# Patient Record
Sex: MALE | Race: AMERICAN INDIAN OR ALASKA NATIVE
[De-identification: names, ages, dates, MRNs, and addresses within clinical notes are randomized per-mention and may not be internally consistent; named-entity substitution may affect disease eponyms.]

---

## 2019-02-17 ENCOUNTER — HOSPITAL ENCOUNTER (INPATIENT)
Dept: HOSPITAL 14 - H.ER | Age: 49
LOS: 9 days | Discharge: HOME | DRG: 321 | End: 2019-02-26
Attending: INTERNAL MEDICINE | Admitting: INTERNAL MEDICINE
Payer: MEDICAID

## 2019-02-17 DIAGNOSIS — R00.1: ICD-10-CM

## 2019-02-17 DIAGNOSIS — M51.26: ICD-10-CM

## 2019-02-17 DIAGNOSIS — G95.89: ICD-10-CM

## 2019-02-17 DIAGNOSIS — M48.061: ICD-10-CM

## 2019-02-17 DIAGNOSIS — J02.9: ICD-10-CM

## 2019-02-17 DIAGNOSIS — N39.41: ICD-10-CM

## 2019-02-17 DIAGNOSIS — D72.829: ICD-10-CM
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
## 2019-02-17 DIAGNOSIS — T38.0X5A: ICD-10-CM

## 2019-02-17 DIAGNOSIS — M21.371: ICD-10-CM

## 2019-02-17 DIAGNOSIS — M48.02: ICD-10-CM

## 2019-02-17 DIAGNOSIS — F17.210: ICD-10-CM

## 2019-02-17 DIAGNOSIS — R53.1: ICD-10-CM

## 2019-02-17 DIAGNOSIS — F12.90: ICD-10-CM

## 2019-02-17 DIAGNOSIS — M51.37: ICD-10-CM

## 2019-02-17 DIAGNOSIS — R25.8: ICD-10-CM

## 2019-02-17 DIAGNOSIS — M50.021: Primary | ICD-10-CM

## 2019-02-17 DIAGNOSIS — K59.00: ICD-10-CM

## 2019-02-17 LAB
ALBUMIN SERPL-MCNC: 4.5 G/DL (ref 3.5–5)
ALBUMIN/GLOB SERPL: 1.3 {RATIO} (ref 1–2.1)
ALT SERPL-CCNC: 34 U/L (ref 21–72)
AST SERPL-CCNC: 22 U/L (ref 17–59)
BASOPHILS # BLD AUTO: 0.1 K/UL (ref 0–0.2)
BASOPHILS NFR BLD: 0.9 % (ref 0–2)
BILIRUB UR-MCNC: NEGATIVE MG/DL
BUN SERPL-MCNC: 19 MG/DL (ref 9–20)
CALCIUM SERPL-MCNC: 9.8 MG/DL (ref 8.4–10.2)
COLOR UR: YELLOW
EOSINOPHIL # BLD AUTO: 0.1 K/UL (ref 0–0.7)
EOSINOPHIL NFR BLD: 2.1 % (ref 0–4)
ERYTHROCYTE [DISTWIDTH] IN BLOOD BY AUTOMATED COUNT: 13.6 % (ref 11.5–14.5)
ERYTHROCYTE [SEDIMENTATION RATE] IN BLOOD: 11 MM/HR (ref 0–15)
GFR NON-AFRICAN AMERICAN: > 60
GLUCOSE UR STRIP-MCNC: (no result) MG/DL
HGB BLD-MCNC: 14.9 G/DL (ref 12–18)
LEUKOCYTE ESTERASE UR-ACNC: (no result) LEU/UL
LYMPHOCYTES # BLD AUTO: 2.2 K/UL (ref 1–4.3)
LYMPHOCYTES NFR BLD AUTO: 31.8 % (ref 20–40)
MCH RBC QN AUTO: 29.6 PG (ref 27–31)
MCHC RBC AUTO-ENTMCNC: 33.6 G/DL (ref 33–37)
MCV RBC AUTO: 88.2 FL (ref 80–94)
MONOCYTES # BLD: 0.7 K/UL (ref 0–0.8)
MONOCYTES NFR BLD: 9.7 % (ref 0–10)
NEUTROPHILS # BLD: 3.8 K/UL (ref 1.8–7)
NEUTROPHILS NFR BLD AUTO: 55.5 % (ref 50–75)
NRBC BLD AUTO-RTO: 0.2 % (ref 0–0)
PH UR STRIP: 6 [PH] (ref 5–8)
PLATELET # BLD: 246 K/UL (ref 130–400)
PMV BLD AUTO: 8.4 FL (ref 7.2–11.7)
PROT UR STRIP-MCNC: NEGATIVE MG/DL
RBC # BLD AUTO: 5.02 MIL/UL (ref 4.4–5.9)
RBC # UR STRIP: (no result) /UL
SP GR UR STRIP: 1.02 (ref 1–1.03)
SQUAMOUS EPITHIAL: 1 /HPF (ref 0–5)
URINE CLARITY: CLEAR
UROBILINOGEN UR-MCNC: (no result) MG/DL (ref 0.2–1)
WBC # BLD AUTO: 6.9 K/UL (ref 4.8–10.8)

## 2019-02-17 NOTE — ED PDOC
HPI: Back


Time Seen by Provider: 02/17/19 18:10


Chief Complaint (Nursing): Back Pain


Chief Complaint (Provider): Back Pain


History Per: Patient


History/Exam Limitations: no limitations


Onset/Duration Of Symptoms: Days (x3 months)


Current Symptoms Are (Timing): Still Present


Quality Of Discomfort: "Pain"


Additional Complaint(s): 


48 year old male with no PMHx presents to the ED with low back pain. Patient 

states that back in November he injured his lower back moving furniture. At the 

time he was seen in [facility] where he had a CT done and was informed he had

a bulging disc in lower back. He was discharged from the ED with a prescription 

for Naproxen and steroids, with no relief in symptoms. Patient did not follow up

with doctor. This past week, his leg weakness became worse and he also has been 

having urinary incontinence intermittently for the past week and a half. Also 

reports having numbness to the saddle area. Since Christmas, he has had 

difficulty having erections. He denies any stool incontinence, abdominal pain, 

hematuria, new trauma, IV drug use, fever or chills.





PMD: none provided





Past Medical History


Reviewed: Historical Data, Nursing Documentation, Vital Signs


Vital Signs: 


                                Last Vital Signs











Temp  98.6 F   02/17/19 18:06


 


Pulse  64   02/17/19 18:06


 


Resp  15   02/17/19 18:06


 


BP  148/86   02/17/19 18:06


 


Pulse Ox  100   02/17/19 18:06














- Medical History


PMH: Back Problems





- Surgical History


Surgical History: No Surg Hx





- Family History


Family History: States: Unknown Family Hx





- Social History


Drugs: Cannabis





- Immunization History


Hx Tetanus Toxoid Vaccination: No


Hx Influenza Vaccination: No


Hx Pneumococcal Vaccination: No





- Home Medications


Home Medications: 


                                Ambulatory Orders











 Medication  Instructions  Recorded


 


No Known Home Med  02/17/19














- Allergies


Allergies/Adverse Reactions: 


                                    Allergies











Allergy/AdvReac Type Severity Reaction Status Date / Time


 


No Known Allergies Allergy   Unverified 02/17/19 18:06














Review of Systems


ROS Statement: Except As Marked, All Systems Reviewed And Found Negative


Constitutional: Negative for: Fever, Chills


Gastrointestinal: Negative for: Abdominal Pain


Genitourinary Male: Positive for: Incontinence (urinary but no stool).  Negative

for: Hematuria


Musculoskeletal: Positive for: Back Pain, Other (leg weakness)





Physical Exam





- Reviewed


Nursing Documentation Reviewed: Yes


Vital Signs Reviewed: Yes





- Physical Exam


Appears: Positive for: In Acute Distress (mild painful)


Skin: Positive for: Normal Color, Warm, Dry


Eye Exam: Positive for: Normal appearance, EOMI, PERRL


Cardiovascular/Chest: Positive for: Regular Rate, Rhythm.  Negative for: Murmur


Respiratory: Positive for: Normal Breath Sounds.  Negative for: Respiratory 

Distress


Pulses-Dorsalis Pedis (L): 2+


Pulses-Dorsalis Pedis (R): 2+


Back: Positive for: Normal Inspection, Vertebral Tenderness (mild), Other (lower

extremity strength 4-5 bilaterally and equal ).  Negative for: L CVA Tenderness,

R CVA Tenderness


Rectal: Positive for: Rectal Tone Is: (intact).  Negative for: Hemorrhoids


Extremity: Positive for: Capillary Refill (less than 2 seconds), Other.  

Negative for: Calf Tenderness


Neurologic/Psych: Positive for: Alert, Oriented (x3), Gait (unsteady with cane; 

pt. seen dragging R lower extremity).  Negative for: Aphasia, Facial Droop





- Laboratory Results


Result Diagrams: 


                                 02/17/19 19:00





                                 02/17/19 19:00


Lab Results: 


                                        











Total Bilirubin  0.3 mg/dl (0.2-1.3)   02/17/19  19:00    


 


AST  22 U/L (17-59)   02/17/19  19:00    


 


ALT  34 U/L (21-72)   02/17/19  19:00    


 


Alkaline Phosphatase  122 U/L ()   02/17/19  19:00    


 


Total Protein  7.8 G/DL (6.3-8.2)   02/17/19  19:00    


 


Albumin  4.5 g/dL (3.5-5.0)   02/17/19  19:00    


 


Globulin  3.3 gm/dL (2.2-3.9)   02/17/19  19:00    


 


Albumin/Globulin Ratio  1.3  (1.0-2.1)   02/17/19  19:00    








                                        











Urine Color  Yellow  (YELLOW)   02/17/19  19:00    


 


Urine Clarity  Clear  (Clear)   02/17/19  19:00    


 


Urine pH  6.0  (5.0-8.0)   02/17/19  19:00    


 


Ur Specific Gravity  1.020  (1.003-1.030)   02/17/19  19:00    


 


Urine Protein  Negative mg/dL (NEGATIVE)   02/17/19  19:00    


 


Urine Glucose (UA)  Neg mg/dL (NEGATIVE)   02/17/19  19:00    


 


Urine Ketones  Negative mg/dL (NEGATIVE)   02/17/19  19:00    


 


Urine Blood  Moderate  (NEGATIVE)   02/17/19  19:00    


 


Urine Nitrate  Negative  (NEGATIVE)   02/17/19  19:00    


 


Urine Bilirubin  Negative  (NEGATIVE)   02/17/19 19:00    


 


Urine Urobilinogen  0.2-1.0 mg/dL (0.2-1.0)   02/17/19  19:00    


 


Ur Leukocyte Esterase  Neg Timothy/uL (Negative)   02/17/19 19:00    


 


Urine RBC (Auto)  7 /hpf (0-3)  H  02/17/19  19:00    


 


Urine Microscopic WBC  1 /hpf (0-5)   02/17/19 19:00    


 


Ur Squamous Epith Cells  1 /hpf (0-5)   02/17/19  19:00    














- ECG


O2 Sat by Pulse Oximetry: 100 (RA)


Pulse Ox Interpretation: Normal





Medical Decision Making


Medical Decision Making: 





Time: 1837


Plan:


--CT lumbar spine


--CMP


--Drug screen


--CBC 


--ESR


--Morphine 6 mg IVP


--Toradol 30 mg IVP


--Zofran 4 mg IVP


--UA





CT LS w/ and w/o contrast: moderate central spinal canal stenosis at L4-5; 

marginal osteophytic spurring throughout the lumbar spine as per radiology 

report.


Case d/w Dr. Conti who recommends MRI LS spine w/o contrast in the AM and 

states pt. does not any other meds at this time.


MRI ordered.


Case d/w Dr. Deleon, hospitalist, and arrangements made for 23 hr observation.





------------------------------------------------------------------

-------------------------------


Scribe Attestation:


Documented by Valentina Carrillo, acting as a scribe for Tex Arechiga PA-C





Provider Scribe Attestation:


All medical record entries made by the Scribe were at my direction and 

personally dictated by me. I have reviewed the chart and agree that the record 

accurately reflects my personal performance of the history, physical exam, 

medical decision making, and the department course for this patient. I have also

personally directed, reviewed, and agree with the discharge instructions and 

disposition.





Disposition





- Clinical Impression


Clinical Impression: 


 Urinary incontinence, Low back pain








- Patient ED Disposition


Is Patient to be Admitted: Yes





- Disposition


Disposition Time: 18:47


Condition: FAIR


Forms:  [facility] (English)





- Pt Status Changed To:


Hospital Disposition Of: Observation

## 2019-02-18 LAB
APTT BLD: 31.4 SECONDS (ref 25.6–37.1)
INR PPP: 1
PROTHROMBIN TIME: 11.6 SECONDS (ref 9.8–13.1)

## 2019-02-18 NOTE — CARD
--------------- APPROVED REPORT --------------





Date of service: 02/18/2019



EKG Measurement

Heart [id number]

CT [id number]

[id number]

[id number]

[id number]



<Conclusion>

Sinus bradycardia with sinus arrhythmia

Possible Septal infarct, age undetermined

Abnormal ECG

## 2019-02-18 NOTE — MRI
Date of service: 



02/18/2019



PROCEDURE:  MR LUMBAR SPINE WITHOUT CONTRAST



HISTORY:

low back pain w/ urinary incontinence



COMPARISON:

Comparison made with CT scan of the lumbar spine 2/17/2019. 



TECHNIQUE:

Multiecho multiplanar sequences were performed through the lumbar 

spine without the use of intravenous contrast.



FINDINGS:

Note that the study is limited by motion artifact normal lumbar 

lordosis.



No acute compression fractures no retropulsed fragments.  Vertebral 

bodies exhibit normal stature.  Marrow signal is normal.  Vertebral 

bodies and facets normally aligned.  Paraspinal soft tissues 

unremarkable. 



Conus superior slightly low lying terminating at approximately the 

lower L2 level. 



T12-L1:

No disc herniation, spinal canal stenosis or neural foraminal 

narrowing. 



L1-2:

No disc herniation, spinal canal stenosis or neural foraminal 

narrowing. 



L2-3:

Adequate disc height and hydration.  No disc herniation or 

significant disc bulge.  Central canal appears adequate.  Facets are 

also mild-to-moderately hypertrophic.  Exit foramina adequate. 



L3-4:

There is mild age related disc desiccation.  Disc space height 

maintained.  Small broad-based though asymmetric bulge -protrusion of 

the posterior annulus larger on the left than right is present with 

extension into the proximal inferior margins of both exit foramina 

more so on the right side.  The disc results in mild bilateral 

lateral recess narrowing and compressive effects on the ventral 

surface of the thecal sac and mild posterior displacement of the 

nearly exiting intrathecal L4 nerve roots.  There is bilateral 

foraminal narrowing more so on the left side..  The facet joints also 

hypertrophic at this level. 



L4-5:

Mild disc desiccation and disc space narrowing more so along the 

posterior disc margin.  Also again seen at this level is a 

small-medium-sized central and bilateral disc herniation which is a 

so also associate with calcification of the posterior annulus.  The 

disc results bilateral lateral recess stenosis and compressive 

effects on the ventral surface of the thecal sac and posterolateral 

displacement of the intrathecal and to a lesser degree just exited 

extra thecal descending S1 nerve roots more so on the left side the.  

Central bony canal appears adequate however.  Facets are hypertrophic 

with mild proximal bilateral foraminal narrowing. 



L5-S1:

There is mild disc desiccation and posterior disc space narrowing..  

Large central and bilateral (right larger than left), disc herniation 

associated with calcification of the posterior annulus is again 

noted..  The disc material extends the below and to a lesser degree 

just above the level of the disc space and results in compressive 

effects on the ventral surface of the thecal sac centrally into the 

right more so than left.  There is compression and posterolateral 

displacement of the intrathecal and just exited extra thecal S1 nerve 

roots.  Central canal is mildly stenotic at this level as well.  

Facets are hypertrophic.  Calcified annulus and not disc material 

extends into the proximal inferior margins of both exit foramina.  

There is mild proximal parietal lateral foraminal narrowing. 



OTHER FINDINGS:

None. 



IMPRESSION:

Slightly limited motion degraded study.



Large disc herniation with calcification of the posterior annulus 

L5-S1 level that extends below and to a slightly lesser degree just 

above the level of the disc space and results in moderate to fairly 

significant compressive effects on the ventral surface of the thecal 

sac as well as intrathecal and just ex exited extra thecal S1 nerve 

roots.  Small to medium-sized disc herniation L4-L5 level. 



Broad-based disc bulge- protrusion L3-L4 level as detailed above.

## 2019-02-18 NOTE — CP.PCM.PN
<Dillon Corley - Last Filed: 02/18/19 13:45>





Subjective





- Date & Time of Evaluation


Date of Evaluation: 02/18/19


Time of Evaluation: 09:00





- Subjective


Subjective: 


 47 yo M with medical hx of bulging spinal disk admitted due to intractable back

pain and neurological changes. 





Patient seen and examined at bedside today. Patient sitting in bed. Pt continues

to have lower extremity weakness with more on RLE. Reports to have urinary 

incontinence for last 2-3 weeks. Pt also reports having B/L UE especially hand 

 weakness with tingling and burning sensation for last 2-3 weeks. Denies any

bowel incontinence. Denies any headache, dizziness, vision changes, neck 

stiffness, fever, diarrhea or rash.








Objective





- Vital Signs/Intake and Output


Vital Signs (last 24 hours): 


                                        











Temp Pulse Resp BP Pulse Ox


 


 98.1 F   53 L  20   128/72   97 


 


 02/18/19 08:21  02/18/19 08:21  02/18/19 08:21  02/18/19 08:21  02/18/19 08:21











- Medications


Medications: 


                               Current Medications





Sodium Chloride (Sodium Chloride 0.9%)  1,000 mls @ 150 mls/hr IV .Q6H40M [facility]


   Stop: 02/19/19 08:41


Morphine Sulfate (Morphine)  2 mg IVP Q4 PRN


   PRN Reason: Pain, moderate (4-7)


   Last Admin: 02/18/19 01:39 Dose:  2 mg


Morphine Sulfate (Morphine)  1 mg IVP Q4 PRN


   PRN Reason: Pain, Mild (1-3)


Morphine Sulfate (Morphine)  4 mg IVP Q4 PRN


   PRN Reason: Pain, severe (8-10)











- Labs


Labs: 


                                        





                                 02/17/19 19:00 





                                 02/17/19 19:00 











- Constitutional


Appears: Well, No Acute Distress





- Head Exam


Head Exam: ATRAUMATIC, NORMAL INSPECTION, NORMOCEPHALIC





- Eye Exam


Eye Exam: EOMI, Normal appearance





- ENT Exam


ENT Exam: Mucous Membranes Moist





- Neck Exam


Neck Exam: Full ROM, Normal Inspection.  absent: Tenderness





- Respiratory Exam


Respiratory Exam: Clear to Ausculation Bilateral, NORMAL BREATHING PATTERN





- Cardiovascular Exam


Cardiovascular Exam: REGULAR RHYTHM, +S1, +S2





- GI/Abdominal Exam


GI & Abdominal Exam: Soft, Normal Bowel Sounds





- Extremities Exam


Additional comments: 


B/L Upper extremity: Motor strength B/L hands: 4/5, sensation and reflex intact,

Tinel + B/L hand. B/L Shoulder 5/5, Spurling negative. 


B/L LE: 











- Neurological Exam


Neurological Exam: Abnormal Gait, Alert, Awake, Motor Sensory Deficit, Oriented 

x3


Additional comments: 


3/5 strength in RLE; especially upon hip flexion as well as dorsiflexion


4/5 strength in remaining extremities


upon standing and trying to walk, pt appears to be dragging RLE, uses cane


no aphasia, no facial droop, mentation intact








- Psychiatric Exam


Psychiatric exam: Normal Affect, Normal Mood





- Skin


Skin Exam: Dry, Intact, Normal Color, Warm





Assessment and Plan





- Assessment and Plan (Free Text)


Assessment: 


47 yo M with medical hx of bulging spinal disk admitted due to intractable back 

pain and neurological changes, including urinary incontinence x 2 weeks. Patient

also reports B/L hand weakness for 2 weeks. 





Plan: 


Lower back pain with neurological changes


- Controlled with pain medication


- CBC, CMP, ESR unremarkable


- 


- CT Lumbar Spine w/ and w/o contrast: Moderate central spinal canal stenosis at

L4-5; marginal osteophytic spurring throughout the lumbar spine 


- MRI L spine: Large disc herniation with calcification of the posterior annulus

L5-S1 level, significant compressive effects on the ventral surface of the 

thecal sac as well as intrathecal and just ex exited extra thecal S1 nerve 

roots.  Small to medium-sized disc herniation L4-L5 level. Broad-based disc 

bulge- protrusion L3-L4 level


- Pain control w/ morphine scale, 1 gm, 2gm, 4 gm Q4 PRN for pain scales 1-3, 4-

7, 8-10


- NS @ 150 ml/hr


- PT/OT evaluation


- Neurosurgery consult on board - Dr. Ambriz; recs appreciated, Will 

schedule for laminectomy


- F/U PT/PTT/CXR





B/L upper extremity weakness


- MRI brain: Unremarkable


- Neurology consulted, Dr. Tamayo, Recs appreciated.





Diet


- NPO for now





DVT prophylaxis


- SCDs for now








<Sandy Smyth - Last Filed: 02/18/19 16:56>





Objective





- Vital Signs/Intake and Output


Vital Signs (last 24 hours): 


                                        











Temp Pulse Resp BP Pulse Ox


 


 98.1 F   55 L  20   128/68   95 


 


 02/18/19 15:56  02/18/19 15:56  02/18/19 15:56  02/18/19 15:56  02/18/19 15:56











- Medications


Medications: 


                               Current Medications





Sodium Chloride (Sodium Chloride 0.9%)  1,000 mls @ 150 mls/hr IV .Q6H40M [facility]


   Stop: 02/19/19 08:41


   Last Admin: 02/18/19 12:10 Dose:  150 mls/hr


Morphine Sulfate (Morphine)  2 mg IVP Q4 PRN


   PRN Reason: Pain, moderate (4-7)


   Last Admin: 02/18/19 01:39 Dose:  2 mg


Morphine Sulfate (Morphine)  1 mg IVP Q4 PRN


   PRN Reason: Pain, Mild (1-3)


Morphine Sulfate (Morphine)  4 mg IVP Q4 PRN


   PRN Reason: Pain, severe (8-10)











- Labs


Labs: 


                                        





                                 02/17/19 19:00 





                                 02/17/19 19:00 





                                        











PT  11.6 Seconds (9.8-13.1)   02/18/19  12:57    


 


INR  1.0   02/18/19  12:57    


 


APTT  31.4 Seconds (25.6-37.1)   02/18/19  12:57    














Attending/Attestation





- Attestation


I have personally seen and examined this patient.: Yes


I have fully participated in the care of the patient.: Yes


I have reviewed all pertinent clinical information, including history, physical 

exam and plan: Yes


Notes (Text): 








Dx: Large Disc Herniation /Protrusion with compressive effect





MRI of Lumbosacral Spine


Large disc herniation with calcification of the posterior annulus L5-S1 level 

that extends below and to a slightly lesser degree just above the level of the 

disc space and results in moderate to fairly significant compressive effects on 

the ventral surface of the thecal sac as well as intrathecal and just ex exited 

extra thecal S1 nerve roots.  Small to medium-sized disc herniation L4-L5 level.




Broad-based disc bulge- protrusion L3-L4 level as detailed above.





Neurosurgery consulted- discussed case with Dr Ambriz - rec Laminectomy - 

scheduled pt for surgery on Thursday 9:30 am


Physical therapy consult


Pain mgt


CXR, EKG , PT, PTT, UA  for preop work up

## 2019-02-18 NOTE — CP.PCM.PN
Subjective





- Date & Time of Evaluation


Date of Evaluation: 02/18/19


Time of Evaluation: 12:42





- Subjective


Subjective: 





48 y o male with lbp and leg pain numbness


has problem walking needs cane


c/o 2-3 episodes of urinary inconence


exam


hasgood st ble but markedly limited by pain


hepersensite inboth l 3 dermatones


in ER rectal exam nl


MRI revied suggest lamiencetomy


explaind to pt and family they understand risks and agree


will schedule








Objective





- Vital Signs/Intake and Output


Vital Signs (last 24 hours): 


                                        











Temp Pulse Resp BP Pulse Ox


 


 98.1 F   53 L  20   128/72   97 


 


 02/18/19 08:21  02/18/19 08:21  02/18/19 08:21  02/18/19 08:21  02/18/19 08:21











- Medications


Medications: 


                               Current Medications





Sodium Chloride (Sodium Chloride 0.9%)  1,000 mls @ 150 mls/hr IV .Q6H40M [facility]


   Stop: 02/19/19 08:41


   Last Admin: 02/18/19 12:10 Dose:  150 mls/hr


Morphine Sulfate (Morphine)  2 mg IVP Q4 PRN


   PRN Reason: Pain, moderate (4-7)


   Last Admin: 02/18/19 01:39 Dose:  2 mg


Morphine Sulfate (Morphine)  1 mg IVP Q4 PRN


   PRN Reason: Pain, Mild (1-3)


Morphine Sulfate (Morphine)  4 mg IVP Q4 PRN


   PRN Reason: Pain, severe (8-10)











- Labs


Labs: 


                                        





                                 02/17/19 19:00 





                                 02/17/19 19:00

## 2019-02-18 NOTE — MRI
Date of service: 



02/18/2019



PROCEDURE:  MRI BRAIN WITHOUT CONTRAST



HISTORY:

Lower extremity weakness, urinary incontinence



COMPARISON:

None available. 



TECHNIQUE:

Multiplanar, multisequence MR images of the brain were obtained 

without intravenous contrast enhancement.



FINDINGS:

Study is limited by motion artifact 



HEMORRHAGE:

No acute parenchymal, subarachnoid nor extra-axial hemorrhage.  No 

evidence of hemosiderin deposition identified on gradient echo 

weighted sequence



DWI:

No evidence of an acute or early subacute infarction seen on 

diffusion imaging.



BRAIN PARENCHYMA:

No mass effect or edema. Minimal prolonged T2 signal changes seen 

within the periventricular white matter likely representing FLAIR 

related CSF interface artifact.



VENTRICLES:

No obstructive hydrocephalus.



CRANIUM:

Unremarkable.



ORBITS:

Orbits and contents unremarkable.



PARANASAL SINUSES/MASTOIDS:

Clear



VASCULAR SYSTEM:

Visualized major vascular flow voids at skull base patent.



OTHER FINDINGS:

None. 



IMPRESSION:

Slightly limited motion degraded study.



No acute intracranial hemorrhage or infarction.  No obvious 

parenchymal nor extra-axial masses or collections.  No acute 

intracranial

## 2019-02-18 NOTE — CT
Date of service: 



02/17/2019



PROCEDURE:  CT Lumbar Spine without contrast



HISTORY:

Low back pain, urinary incontinence



COMPARISON:

None available.



TECHNIQUE:

Axial computed tomography images were obtained of the lumbar spine 

without and with intravenous contrast. Coronal and sagittal 

reformatted images were created and reviewed. 



90 mL Omnipaque 300 was injected intravenously. 



Radiation dose:



Total exam DLP = 3141.12 mGy-cm.



This CT exam was performed using one or more of the following dose 

reduction techniques: Automated exposure control, adjustment of the 

mA and/or kV according to patient size, and/or use of iterative 

reconstruction technique.



FINDINGS:



VERTEBRAE:

There is normal alignment of the lumbar vertebral bodies.  There is 

normal lumbar lordosis.  There is no acute fracture, spondylolysis or 

spondylolisthesis.  Bone mineralization is normal. 



DISCS/SPINAL CANAL/NEURAL FORAMINA:

Evaluation of the discs and spinal canal is limited on noncontrast CT 

examination.  Allowing for this, 



L1-2: No large disc herniation, neural foraminal or spinal canal 

stenosis.



L2-3: Mild posterior disc bulge without central spinal canal 

stenosis.  Mild bilateral facet arthropathy contribute to moderate 

right and mild left neural foraminal narrowing. 



L3-4: Diffuse posterior disc bulge and mild ligamentum flavum 

infolding without central spinal canal stenosis.  Mild bilateral 

facet arthropathy contribute to moderate neural foraminal narrowing. 



L4-5: There is partial calcification of the disc posteriorly.  There 

is a broad-based central and left posterolateral disc protrusion 

which in conjunction with mild ligamentum flavum infolding result in 

mild spinal canal stenosis.  Mild bilateral facet arthropathy 

contribute to moderate to severe right and severe left neural 

foraminal narrowing. 



L5-S1:  Partial calcification of the disc posteriorly.  Broad-based 

central disc protrusion indents the thecal sac and in conjunction 

with mild ligamentum flavum infolding result in mild spinal canal 

stenosis.  Mild bilateral facet arthropathy contribute to severe 

right and moderate to severe left neural foraminal narrowing.



PARASPINAL SOFT TISSUES:

The paraspinous soft tissues are normal.  Imaged portion of the 

retroperitoneum is within normal limits. 



OTHER FINDINGS:

There is no abnormal intramedullary or leptomeningeal enhancement. 



IMPRESSION:

1.  No acute fracture, spondylolysis or spondylolisthesis. 



2.  Moderate degenerative disc disease at L4-5 and L5-S1 with partial 

calcification of the discs posteriorly, worse at L5-S1 with a 

broad-based central disc protrusion, mild spinal canal stenosis, 

severe right and moderate to severe left neural foraminal narrowing. 



3.  Additional comments as described above. 



A preliminary report was provided by [employer] [facility].



The final report is tagged to the PA review folder.

## 2019-02-18 NOTE — CP.PCM.CON
History of Present Illness





- History of Present Illness


History of Present Illness: 





  48 yr old male who is here for subacute onset back pain. 


Neurology consult dictated. 


Plan: 


1. continue decadron


2. Appreciate Dr. Ambriz consultation. 


3. Laminectomy planned for Thursday. 


Thank you


Dr. Tamayo


Neurology 





Past Patient History





- Past Medical History & Family History


Past Medical History?: Yes





- Past Social History


Smoking Status: Heavy Smoker > 10 Cigarettes Daily





- CARDIAC


Hx Cardiac Disorders: No





- PULMONARY


Hx Respiratory Disorders: No





- NEUROLOGICAL


Hx Neurological Disorder: No





- HEENT


Hx HEENT Problems: No





- RENAL


Hx Chronic Kidney Disease: No





- ENDOCRINE/METABOLIC


Hx Endocrine Disorders: No





- HEMATOLOGICAL/ONCOLOGICAL


Hx Blood Disorders: No





- INTEGUMENTARY


Hx Dermatological Problems: No





- MUSCULOSKELETAL/RHEUMATOLOGICAL


Hx Musculoskeletal Disorders: Yes


Hx Back Pain: Yes


Hx Falls: Yes (patient states hs fallen at least once everyday for the past 3 

days)


Hx Unsteady Gait: Yes





- GASTROINTESTINAL


Hx Gastrointestinal Disorders: No





- GENITOURINARY/GYNECOLOGICAL


Hx Genitourinary Disorders: Yes


Hx Incontinence: Yes





- PSYCHIATRIC


Hx Psychophysiologic Disorder: No


Hx Substance Use: Yes (cannabis)





- SURGICAL HISTORY


Hx Surgeries: No





- ANESTHESIA


Hx Anesthesia: No





Meds


Allergies/Adverse Reactions: 


                                    Allergies











Allergy/AdvReac Type Severity Reaction Status Date / Time


 


No Known Allergies Allergy   Unverified 02/17/19 18:06














- Medications


Medications: 


                               Current Medications





Sodium Chloride (Sodium Chloride 0.9%)  1,000 mls @ 150 mls/hr IV .[id number] [facility]


   Stop: 02/19/19 08:41


   Last Admin: 02/18/19 12:10 Dose:  150 mls/hr


Morphine Sulfate (Morphine)  2 mg IVP Q4 PRN


   PRN Reason: Pain, moderate (4-7)


   Last Admin: 02/18/19 01:39 Dose:  2 mg


Morphine Sulfate (Morphine)  1 mg IVP Q4 PRN


   PRN Reason: Pain, Mild (1-3)


Morphine Sulfate (Morphine)  4 mg IVP Q4 PRN


   PRN Reason: Pain, severe (8-10)











Results





- Vital Signs


Recent Vital Signs: 


                                Last Vital Signs











Temp  98.1 F   02/18/19 15:56


 


Pulse  55 L  02/18/19 15:56


 


Resp  20   02/18/19 15:56


 


BP  128/68   02/18/19 15:56


 


Pulse Ox  95   02/18/19 15:56














- Labs


Result Diagrams: 


                                 02/17/19 19:00





                                 02/17/19 19:00


Labs: 


                         Laboratory Results - last 24 hr











  02/17/19 02/17/19 02/17/19





  19:00 19:00 19:00


 


WBC  6.9  


 


RBC  5.02  


 


Hgb  14.9  


 


Hct  44.3  


 


MCV  88.2  


 


MCH  29.6  


 


MCHC  33.6  


 


RDW  13.6  


 


Plt Count  246  


 


MPV  8.4  


 


Neut % (Auto)  55.5  


 


Lymph % (Auto)  31.8  


 


Mono % (Auto)  9.7  


 


Eos % (Auto)  2.1  


 


Baso % (Auto)  0.9  


 


Neut # (Auto)  3.8  


 


Lymph # (Auto)  2.2  


 


Mono # (Auto)  0.7  


 


Eos # (Auto)  0.1  


 


Baso # (Auto)  0.1  


 


ESR  11  


 


PT   


 


INR   


 


APTT   


 


Sodium   142 


 


Potassium   4.6 


 


Chloride   102 


 


Carbon Dioxide   27 


 


Anion Gap   18 


 


BUN   19 


 


Creatinine   1.2 


 


Est GFR ( Amer)   > 60 


 


Est GFR (Non-Af Amer)   > 60 


 


Random Glucose   94 


 


Calcium   9.8 


 


Total Bilirubin   0.3 


 


AST   22 


 


ALT   34 


 


Alkaline Phosphatase   122 


 


Total Creatine Kinase   


 


Total Protein   7.8 


 


Albumin   4.5 


 


Globulin   3.3 


 


Albumin/Globulin Ratio   1.3 


 


Urine Color   


 


Urine Clarity   


 


Urine pH   


 


Ur Specific Gravity   


 


Urine Protein   


 


Urine Glucose (UA)   


 


Urine Ketones   


 


Urine Blood   


 


Urine Nitrate   


 


Urine Bilirubin   


 


Urine Urobilinogen   


 


Ur Leukocyte Esterase   


 


Urine RBC (Auto)   


 


Urine Microscopic WBC   


 


Ur Squamous Epith Cells   


 


Urine Opiates Screen    Negative


 


Urine Methadone Screen    Negative


 


Ur Barbiturates Screen    Negative


 


Ur Phencyclidine Scrn    Negative


 


Ur Amphetamines Screen    Negative


 


U Benzodiazepines Scrn    Negative


 


U Oth Cocaine Metabols    Negative


 


U Cannabinoids Screen    Positive H














  02/17/19 02/18/19 02/18/19





  19:00 09:10 12:57


 


WBC   


 


RBC   


 


Hgb   


 


Hct   


 


MCV   


 


MCH   


 


MCHC   


 


RDW   


 


Plt Count   


 


MPV   


 


Neut % (Auto)   


 


Lymph % (Auto)   


 


Mono % (Auto)   


 


Eos % (Auto)   


 


Baso % (Auto)   


 


Neut # (Auto)   


 


Lymph # (Auto)   


 


Mono # (Auto)   


 


Eos # (Auto)   


 


Baso # (Auto)   


 


ESR   


 


PT    11.6


 


INR    1.0


 


APTT    31.4


 


Sodium   


 


Potassium   


 


Chloride   


 


Carbon Dioxide   


 


Anion Gap   


 


BUN   


 


Creatinine   


 


Est GFR ( Amer)   


 


Est GFR (Non-Af Amer)   


 


Random Glucose   


 


Calcium   


 


Total Bilirubin   


 


AST   


 


ALT   


 


Alkaline Phosphatase   


 


Total Creatine Kinase   196 H 


 


Total Protein   


 


Albumin   


 


Globulin   


 


Albumin/Globulin Ratio   


 


Urine Color  Yellow  


 


Urine Clarity  Clear  


 


Urine pH  6.0  


 


Ur Specific Gravity  1.020  


 


Urine Protein  Negative  


 


Urine Glucose (UA)  Neg  


 


Urine Ketones  Negative  


 


Urine Blood  Moderate  


 


Urine Nitrate  Negative  


 


Urine Bilirubin  Negative  


 


Urine Urobilinogen  0.2-1.0  


 


Ur Leukocyte Esterase  Neg  


 


Urine RBC (Auto)  7 H  


 


Urine Microscopic WBC  1  


 


Ur Squamous Epith Cells  1  


 


Urine Opiates Screen   


 


Urine Methadone Screen   


 


Ur Barbiturates Screen   


 


Ur Phencyclidine Scrn   


 


Ur Amphetamines Screen   


 


U Benzodiazepines Scrn   


 


U Oth Cocaine Metabols   


 


U Cannabinoids Screen

## 2019-02-18 NOTE — RAD
Date of service: 



02/18/2019



HISTORY:

 pre-op 



COMPARISON:

No prior.



TECHNIQUE:

Chest PA and lateral



FINDINGS:



LUNGS:

No active pulmonary disease.



PLEURA:

No significant pleural effusion identified. No pneumothorax apparent.



CARDIOVASCULAR:

No aortic atherosclerotic calcification present.



Normal cardiac size. No pulmonary vascular congestion. 



OSSEOUS STRUCTURES:

No significant abnormalities.



VISUALIZED UPPER ABDOMEN:

Normal.



OTHER FINDINGS:

None.



IMPRESSION:

No active disease.

## 2019-02-18 NOTE — CP.PCM.HP
<Gabbie Iniguez - Last Filed: 02/18/19 01:58>





History of Present Illness





- History of Present Illness


History of Present Illness: 





49 yo M with medical hx of bulging spinal disk admitted due to intractable back 

pain and neurological changes. PT states that in Nov 2018, he injured his lower 

back moving furniture, after which he was seen at [facility] where he had a 

CT done and was informed he had a bulging disc in lower back; he was discharged 

from ED with prescriptions for naproxen and steroids course, and recommended 

follow up, however states that due to insurance issues he was not able to follow

up. He reports no relief in back pain symptoms, and that symptoms have been 

progressively worsening.





States that over the past few months he has developed "foot drop" and weakness 

in RIGHT lower extremity, has started using a cane to walk and feels like he has

to drag his right leg more than he can lift it.


Over the past 2 weeks, has been experiencing urinary incontinence; will get urge

to go but cannot make it to the bathroom and urinates almost immediately. No 

loss of bowel control. No fevers. Reports that he feels a "tightness" in the 

saddle area but denies that it feels "numb" or like "pins and needles."





Denies headache, blurred vision, chest pain, difficulty breathing, stool 

incontinence, hematuria, dyrsuria, fever or chills.





PMD: none





Past Med hx: no known; was told to take aspirin in the past but does not


Past Surg hx: no surgeries


Social hx: 1 ppd x 20 yrs; no alcohol use; has been smoking marijuana regularly 

since injury to try to avoid pain


Fam hx: diabetes, HTN


Allergies: nkda


Meds: no chronic medications





ED course:


Vitals: /86, HR 64, T 98.6, O2 sat 100 on room air, RR 15


-CMP, CBC unremarkable


-Drug screen - pos for cannabinoids


-ESR wnl


-UA mod blood





Given:


-Morphine 6 mg IVP


-Toradol 30 mg IVP


-Zofran 4 mg IVP








CT Lumbar Spine w/ and w/o contrast: moderate central spinal canal stenosis at 

L4-5; marginal osteophytic spurring throughout the lumbar spine as per radiology

report.





Neurosurgery consulted from ED, Dr. Conti, who recommends MRI Lumbar spine

w/o contrast in the AM.


MRI ordered.














Present on Admission





- Present on Admission


Any Indicators Present on Admission: No





Review of Systems





- Review of Systems


Review of Systems: 





as per HPI





Past Patient History





- Past Social History


Smoking Status: Heavy Smoker > 10 Cigarettes Daily


Alcohol: None


Drugs: Cannabis





- CARDIAC


Hx Cardiac Disorders: No





- PULMONARY


Hx Respiratory Disorders: No





- NEUROLOGICAL


Hx Neurological Disorder: No





- HEENT


Hx HEENT Problems: No





- RENAL


Hx Chronic Kidney Disease: No





- ENDOCRINE/METABOLIC


Hx Endocrine Disorders: No





- HEMATOLOGICAL/ONCOLOGICAL


Hx Blood Disorders: No





- INTEGUMENTARY


Hx Dermatological Problems: No





- MUSCULOSKELETAL/RHEUMATOLOGICAL


Hx Musculoskeletal Disorders: Yes


Hx Back Pain: Yes





- GASTROINTESTINAL


Hx Gastrointestinal Disorders: No





- GENITOURINARY/GYNECOLOGICAL


Hx Genitourinary Disorders: No





- PSYCHIATRIC


Hx Psychophysiologic Disorder: No


Hx Substance Use: No





- SURGICAL HISTORY


Hx Surgeries: No





- ANESTHESIA


Hx Anesthesia: No





Meds


Allergies/Adverse Reactions: 


                                    Allergies











Allergy/AdvReac Type Severity Reaction Status Date / Time


 


No Known Allergies Allergy   Unverified 02/17/19 18:06














Physical Exam





- Constitutional


Additional comments: 





appears uncomfortable and upset/worried over state of health





- Eye Exam


Eye Exam: EOMI, Normal appearance, PERRL.  absent: Scleral icterus





- ENT Exam


ENT Exam: Mucous Membranes Moist, Normal Exam





- Neck Exam


Neck exam: Positive for: Full Rom, Normal Inspection





- Respiratory Exam


Respiratory Exam: Clear to Auscultation Bilateral, NORMAL BREATHING PATTERN





- Cardiovascular Exam


Cardiovascular Exam: +S1, +S2





- GI/Abdominal Exam


GI & Abdominal Exam: Normal Bowel Sounds, Soft.  absent: Distended, Tenderness





- Extremities Exam


Extremities exam: Positive for: normal capillary refill.  Negative for: calf 

tenderness, joint swelling, pedal edema





- Back Exam


Back exam: paraspinal tenderness (lumbar)





- Neurological Exam


Neurological exam: Alert, Oriented x3


Additional comments: 





3/5 strength in RLE; especially upon hip flexion as well as dorsiflexion


4/5 strength in remaining extremities


upon standing and trying to walk, pt appears to be dragging RLE, uses cane


no aphasia, no facial droop, mentation intact





- Psychiatric Exam


Psychiatric exam: Anxious


Additional comments: 





appears anxious about diagnosis/health





- Skin


Skin Exam: Dry, Warm





Results





- Vital Signs


Recent Vital Signs: 





                                Last Vital Signs











Temp  98.4 F   02/17/19 23:59


 


Pulse  59 L  02/17/19 23:59


 


Resp  18   02/17/19 23:59


 


BP  128/68   02/17/19 23:59


 


Pulse Ox  98   02/17/19 23:59














- Labs


Result Diagrams: 


                                 02/17/19 19:00





                                 02/17/19 19:00


Labs: 





                         Laboratory Results - last 24 hr











  02/17/19 02/17/19 02/17/19





  19:00 19:00 19:00


 


WBC  6.9  


 


RBC  5.02  


 


Hgb  14.9  


 


Hct  44.3  


 


MCV  88.2  


 


MCH  29.6  


 


MCHC  33.6  


 


RDW  13.6  


 


Plt Count  246  


 


MPV  8.4  


 


Neut % (Auto)  55.5  


 


Lymph % (Auto)  31.8  


 


Mono % (Auto)  9.7  


 


Eos % (Auto)  2.1  


 


Baso % (Auto)  0.9  


 


Neut # (Auto)  3.8  


 


Lymph # (Auto)  2.2  


 


Mono # (Auto)  0.7  


 


Eos # (Auto)  0.1  


 


Baso # (Auto)  0.1  


 


ESR  11  


 


Sodium   142 


 


Potassium   4.6 


 


Chloride   102 


 


Carbon Dioxide   27 


 


Anion Gap   18 


 


BUN   19 


 


Creatinine   1.2 


 


Est GFR ( Amer)   > 60 


 


Est GFR (Non-Af Amer)   > 60 


 


Random Glucose   94 


 


Calcium   9.8 


 


Total Bilirubin   0.3 


 


AST   22 


 


ALT   34 


 


Alkaline Phosphatase   122 


 


Total Protein   7.8 


 


Albumin   4.5 


 


Globulin   3.3 


 


Albumin/Globulin Ratio   1.3 


 


Urine Color   


 


Urine Clarity   


 


Urine pH   


 


Ur Specific Gravity   


 


Urine Protein   


 


Urine Glucose (UA)   


 


Urine Ketones   


 


Urine Blood   


 


Urine Nitrate   


 


Urine Bilirubin   


 


Urine Urobilinogen   


 


Ur Leukocyte Esterase   


 


Urine RBC (Auto)   


 


Urine Microscopic WBC   


 


Ur Squamous Epith Cells   


 


Urine Opiates Screen    Negative


 


Urine Methadone Screen    Negative


 


Ur Barbiturates Screen    Negative


 


Ur Phencyclidine Scrn    Negative


 


Ur Amphetamines Screen    Negative


 


U Benzodiazepines Scrn    Negative


 


U Oth Cocaine Metabols    Negative


 


U Cannabinoids Screen    Positive H














  02/17/19





  19:00


 


WBC 


 


RBC 


 


Hgb 


 


Hct 


 


MCV 


 


MCH 


 


MCHC 


 


RDW 


 


Plt Count 


 


MPV 


 


Neut % (Auto) 


 


Lymph % (Auto) 


 


Mono % (Auto) 


 


Eos % (Auto) 


 


Baso % (Auto) 


 


Neut # (Auto) 


 


Lymph # (Auto) 


 


Mono # (Auto) 


 


Eos # (Auto) 


 


Baso # (Auto) 


 


ESR 


 


Sodium 


 


Potassium 


 


Chloride 


 


Carbon Dioxide 


 


Anion Gap 


 


BUN 


 


Creatinine 


 


Est GFR ( Amer) 


 


Est GFR (Non-Af Amer) 


 


Random Glucose 


 


Calcium 


 


Total Bilirubin 


 


AST 


 


ALT 


 


Alkaline Phosphatase 


 


Total Protein 


 


Albumin 


 


Globulin 


 


Albumin/Globulin Ratio 


 


Urine Color  Yellow


 


Urine Clarity  Clear


 


Urine pH  6.0


 


Ur Specific Gravity  1.020


 


Urine Protein  Negative


 


Urine Glucose (UA)  Neg


 


Urine Ketones  Negative


 


Urine Blood  Moderate


 


Urine Nitrate  Negative


 


Urine Bilirubin  Negative


 


Urine Urobilinogen  0.2-1.0


 


Ur Leukocyte Esterase  Neg


 


Urine RBC (Auto)  7 H


 


Urine Microscopic WBC  1


 


Ur Squamous Epith Cells  1


 


Urine Opiates Screen 


 


Urine Methadone Screen 


 


Ur Barbiturates Screen 


 


Ur Phencyclidine Scrn 


 


Ur Amphetamines Screen 


 


U Benzodiazepines Scrn 


 


U Oth Cocaine Metabols 


 


U Cannabinoids Screen 














Assessment & Plan





- Assessment and Plan (Free Text)


Assessment: 





49 yo M with medical hx of bulging spinal disk admitted due to intractable back 

pain and neurological changes, including urinary incontinence x 2 weeks.


Plan: 








Back pain with neurological changes 


- CT Lumbar Spine w/ and w/o contrast showed  moderate central spinal canal 

stenosis at L4-5; marginal osteophytic spurring throughout the lumbar spine 


- Neurosurgery consulted - Dr. Ambriz; recs appreciated, MRI ordered


- MRI in am


- Pain control w/ morphine scale, 1 gm, 2gm, 4 gm Q4 PRN for pain scales 1-3, 4-

7, 8-10





Diet


- NPO for now








DVT prophylaxis


- SCDs for now





Patient discussed w/ Dr. Deleon.





<Artemio Deleon - Last Filed: 02/18/19 04:56>





Results





- Vital Signs


Recent Vital Signs: 





                                Last Vital Signs











Temp  97.9 F   02/18/19 02:40


 


Pulse  56 L  02/18/19 02:40


 


Resp  18   02/18/19 02:40


 


BP  130/61   02/18/19 02:40


 


Pulse Ox  96   02/18/19 02:40














- Labs


Result Diagrams: 


                                 02/17/19 19:00





                                 02/17/19 19:00


Labs: 





                         Laboratory Results - last 24 hr











  02/17/19 02/17/19 02/17/19





  19:00 19:00 19:00


 


WBC  6.9  


 


RBC  5.02  


 


Hgb  14.9  


 


Hct  44.3  


 


MCV  88.2  


 


MCH  29.6  


 


MCHC  33.6  


 


RDW  13.6  


 


Plt Count  246  


 


MPV  8.4  


 


Neut % (Auto)  55.5  


 


Lymph % (Auto)  31.8  


 


Mono % (Auto)  9.7  


 


Eos % (Auto)  2.1  


 


Baso % (Auto)  0.9  


 


Neut # (Auto)  3.8  


 


Lymph # (Auto)  2.2  


 


Mono # (Auto)  0.7  


 


Eos # (Auto)  0.1  


 


Baso # (Auto)  0.1  


 


ESR  11  


 


Sodium   142 


 


Potassium   4.6 


 


Chloride   102 


 


Carbon Dioxide   27 


 


Anion Gap   18 


 


BUN   19 


 


Creatinine   1.2 


 


Est GFR ( Amer)   > 60 


 


Est GFR (Non-Af Amer)   > 60 


 


Random Glucose   94 


 


Calcium   9.8 


 


Total Bilirubin   0.3 


 


AST   22 


 


ALT   34 


 


Alkaline Phosphatase   122 


 


Total Protein   7.8 


 


Albumin   4.5 


 


Globulin   3.3 


 


Albumin/Globulin Ratio   1.3 


 


Urine Color   


 


Urine Clarity   


 


Urine pH   


 


Ur Specific Gravity   


 


Urine Protein   


 


Urine Glucose (UA)   


 


Urine Ketones   


 


Urine Blood   


 


Urine Nitrate   


 


Urine Bilirubin   


 


Urine Urobilinogen   


 


Ur Leukocyte Esterase   


 


Urine RBC (Auto)   


 


Urine Microscopic WBC   


 


Ur Squamous Epith Cells   


 


Urine Opiates Screen    Negative


 


Urine Methadone Screen    Negative


 


Ur Barbiturates Screen    Negative


 


Ur Phencyclidine Scrn    Negative


 


Ur Amphetamines Screen    Negative


 


U Benzodiazepines Scrn    Negative


 


U Oth Cocaine Metabols    Negative


 


U Cannabinoids Screen    Positive H














  02/17/19





  19:00


 


WBC 


 


RBC 


 


Hgb 


 


Hct 


 


MCV 


 


MCH 


 


MCHC 


 


RDW 


 


Plt Count 


 


MPV 


 


Neut % (Auto) 


 


Lymph % (Auto) 


 


Mono % (Auto) 


 


Eos % (Auto) 


 


Baso % (Auto) 


 


Neut # (Auto) 


 


Lymph # (Auto) 


 


Mono # (Auto) 


 


Eos # (Auto) 


 


Baso # (Auto) 


 


ESR 


 


Sodium 


 


Potassium 


 


Chloride 


 


Carbon Dioxide 


 


Anion Gap 


 


BUN 


 


Creatinine 


 


Est GFR ( Amer) 


 


Est GFR (Non-Af Amer) 


 


Random Glucose 


 


Calcium 


 


Total Bilirubin 


 


AST 


 


ALT 


 


Alkaline Phosphatase 


 


Total Protein 


 


Albumin 


 


Globulin 


 


Albumin/Globulin Ratio 


 


Urine Color  Yellow


 


Urine Clarity  Clear


 


Urine pH  6.0


 


Ur Specific Gravity  1.020


 


Urine Protein  Negative


 


Urine Glucose (UA)  Neg


 


Urine Ketones  Negative


 


Urine Blood  Moderate


 


Urine Nitrate  Negative


 


Urine Bilirubin  Negative


 


Urine Urobilinogen  0.2-1.0


 


Ur Leukocyte Esterase  Neg


 


Urine RBC (Auto)  7 H


 


Urine Microscopic WBC  1


 


Ur Squamous Epith Cells  1


 


Urine Opiates Screen 


 


Urine Methadone Screen 


 


Ur Barbiturates Screen 


 


Ur Phencyclidine Scrn 


 


Ur Amphetamines Screen 


 


U Benzodiazepines Scrn 


 


U Oth Cocaine Metabols 


 


U Cannabinoids Screen 














Assessment & Plan





- Assessment and Plan (Free Text)


Plan: 





Seen and examined patient personally. Agree with resident's A/P. 49 yo M with hx

of lumbar disc herniation p/w severe pain and b/l LE weakness and urinary 

incontinence. CT showed moderate L4-L5 spinal stenosis. Exam showed proximal 

muscle weakness in hip girdle. Neurosurgery on board and recommended MRI. Will 

follow recommendations. Pain management.

## 2019-02-19 RX ADMIN — DEXAMETHASONE SODIUM PHOSPHATE SCH MG: 4 INJECTION, SOLUTION INTRAMUSCULAR; INTRAVENOUS at 16:53

## 2019-02-19 RX ADMIN — PANTOPRAZOLE SODIUM SCH MG: 40 TABLET, DELAYED RELEASE ORAL at 09:56

## 2019-02-19 NOTE — MRI
Date of service: 



02/19/2019



PROCEDURE:  MR CERVICAL SPINE WITHOUT CONTRAST



HISTORY:

new neuro deficit



COMPARISON:

No prior study available for comparison. 



TECHNIQUE:

Multiecho multiplanar sequences were performed through the cervical 

spine without the use of intravenous contrast.



FINDINGS:

Limited motion degraded study.  



The current study reveals no acute compression fractures no 

retropulsed fragments.  Vertebral bodies exhibit normal stature.  

There straightening of the normal cervical lordosis which may be 

secondary to patient positioning in the gantry however underlying 

element of muscle spasm may contribute..  Marrow signal of relatively 

normal for this patient's stated age. 



Paraspinal soft tissues unremarkable...  



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 

pedicles. 



C2-C3:

There is mild age related disc desiccation.  No disc herniation or 

significant disc bulge.  The uncovertebral facets are hypertrophic.  

Exit foramina are narrowed on the left and adequate on the right. 



C3-C4:

There is mild age related disc desiccation.  No disc herniation 

however there is mild hypertrophic uncovertebral facet joints right 

greater than left.  Central canal narrowed.  Exit foramina are 

stenotic on the right and adequate on the left.. 



C4-C5:

There is a small to medium-sized central and bilateral (left larger 

than right) disc herniation on which results in moderate to fairly 

significant cord compression more so on the left side..  Disc also on 

extends inferiorly over short distance to near the CT superior aspect 

of the C5-C6 disc space level..  As mentioned above the canal is 

stenotic due to short pedicles and further narrowed by the 

aforementioned disc.  In addition, there are focal areas of increased 

T2 signal within cervical spinal cord at and below this disc space 

level consistent with gliosis/myelomalacia. The uncovertebral facets 

are also hypertrophic with bilateral foraminal stenosis. 



C5-C6:

There is mild age related disc desiccation.  Disc space height 

maintained no significant disc herniation arising from this disc 

space level however as mentioned above, disc herniation arising from 

the C4-C5 level extends inferiorly to just above this disc space 

level. Central canal is slightly narrowed however no significant cord 

compression.  Uncovertebral facets are hypertrophic.  Exit foramina 

are stenotic bilaterally. 



C6-C7:

There is also mild disc desiccation however disc space height is 

maintained..  Small irregular central and left parasagittal disc 

bulge results in mild compressive effects on the ventral surface of 

the thecal sac however no evidence of cord compression.  Exit 

foramina are adequate. 



C7-T1:

No  disc herniation, spinal canal stenosis or neural foraminal 

narrowing. 



OTHER FINDINGS:

None. 



IMPRESSION:

Limited motion degraded study.  



No acute fractures.  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 

on due to short pedicles. 



There is a medium-sized central and bilateral left larger than right 

disc herniation at the C4-C5 level which extends with inferior 

subligamentous extension of disc over the most of the length of the 

C5 segment to near the C5-C6 disc space level.  There is moderate to 

fairly significant cord compression of with gliosis and/or 

myelomalacia in the cervical cord at this level as described. 



Multilevel degenerative spondylosis with varying degrees of canal and 

foraminal stenosis as described above. 



Note that these findings were discussed with 6 Bairon Parks at 

approximately 3:18 p.m. with written down and read back verification

## 2019-02-19 NOTE — CP.PCM.PN
<Dillon Corley - Last Filed: 02/19/19 10:57>





Subjective





- Date & Time of Evaluation


Date of Evaluation: 02/19/19


Time of Evaluation: 08:25





- Subjective


Subjective: 


 47 yo M with medical hx of bulging spinal disc admitted due to intractable back

pain and neurological changes. 





Patient seen and evaluated at bedside in AM. No acute events overnight. Patient 

sitting in chair in mild distress. Reports having 4-5 episodes of urinary 

incontinence last night. Continues to have back pain, which is stable with pain 

medications. Patient states he does not want percocet as pain medication due to 

abuse potential. Tolerating diet well PO. Denies CP, SOB, headache, dizziness, 

vision changes, nausea, vomiting, bowel incontinence. Rectal exam WNL in ER. 








Objective





- Vital Signs/Intake and Output


Vital Signs (last 24 hours): 


                                        











Temp Pulse Resp BP Pulse Ox


 


 98.2 F   55 L  19   123/58 L  99 


 


 02/19/19 08:18  02/19/19 08:18  02/19/19 08:18  02/19/19 08:18  02/19/19 08:18











- Medications


Medications: 


                               Current Medications





Dexamethasone (Decadron)  4 mg PO Q8 [facility]


   Last Admin: 02/19/19 00:12 Dose:  4 mg


Docusate Sodium (Colace)  100 mg PO BID [facility]


   Last Admin: 02/18/19 18:26 Dose:  100 mg


Morphine Sulfate (Morphine)  2 mg IVP Q4 PRN


   PRN Reason: Pain, moderate (4-7)


   Last Admin: 02/18/19 18:24 Dose:  2 mg


Oxycodone/Acetaminophen (Percocet 5/325 Mg Tab)  1 tab PO Q6 PRN


   PRN Reason: Pain, moderate (4-7)


   Stop: 02/21/19 16:58


Pantoprazole Sodium (Protonix Ec Tab)  40 mg PO DAILY [facility]


Tramadol HCl (Ultram)  50 mg PO Q6 PRN


   PRN Reason: Pain, Mild (1-3)











- Labs


Labs: 


                                        





                                 02/17/19 19:00 





                                 02/17/19 19:00 





                                        











PT  11.6 Seconds (9.8-13.1)   02/18/19  12:57    


 


INR  1.0   02/18/19  12:57    


 


APTT  31.4 Seconds (25.6-37.1)   02/18/19  12:57    














- Constitutional


Appears: Well, No Acute Distress





- Head Exam


Head Exam: ATRAUMATIC, NORMAL INSPECTION, NORMOCEPHALIC





- Eye Exam


Eye Exam: EOMI, PERRL





- ENT Exam


ENT Exam: Mucous Membranes Moist





- Neck Exam


Neck Exam: Full ROM





- Respiratory Exam


Respiratory Exam: Clear to Ausculation Bilateral, NORMAL BREATHING PATTERN.  

absent: Rales, Rhonchi, Wheezes, Respiratory Distress





- Cardiovascular Exam


Cardiovascular Exam: REGULAR RHYTHM, +S1, +S2.  absent: Murmur





- GI/Abdominal Exam


GI & Abdominal Exam: Soft, Normal Bowel Sounds.  absent: Tenderness





- Neurological Exam


Neurological Exam: Abnormal Gait, Alert, Awake, Motor Sensory Deficit, Oriented 

x3


Additional comments: 


3/5 strength in RLE; especially upon hip flexion as well as dorsiflexion


4/5 strength in remaining extremities


upon standing and trying to walk, pt appears to be dragging RLE, uses cane


no aphasia, no facial droop, mentation intact








- Psychiatric Exam


Psychiatric exam: Normal Affect, Normal Mood





- Skin


Skin Exam: Dry, Intact, Normal Color, Warm





Assessment and Plan





- Assessment and Plan (Free Text)


Assessment: 


47 yo M with medical hx of bulging spinal disk admitted due to intractable back 

pain and neurological changes, including urinary incontinence x 2 weeks.





CT Lumbar Spine w/ and w/o contrast: Moderate central spinal canal stenosis at 

L4-5; marginal osteophytic spurring throughout the lumbar spine 


MRI L spine: Large disc herniation with calcification of the posterior annulus 

L5-S1 level, significant compressive effects on the ventral surface of the 

thecal sac as well as intrathecal and just ex exited extra thecal S1 nerve 

roots.  Small to medium-sized disc herniation L4-L5 level. Broad-based disc 

bulge- protrusion L3-L4 level





Plan: 


Lower back pain with neurological changes


- Likely secondary to lumber disc herniation


- Controlled with pain medications


- Pain control w/Tramadol, Percocet and Morphine 2 mg IV


- Dexamethazone 4 mg IVP Q8hr


- Neurosurgery consult on board - Dr. Ambriz; recs appreciated.


- Pt scheduled for laminectomy on Thursday 9:30 AM


- Preop labs PT/PTT/INR, CXR, EKG unremarkable.





B/L upper extremity weakness


- MRI brain: Unremarkable


- Neurology consulted, Dr. Tamayo, Recs appreciated.





Diet


- Regular diet





DVT prophylaxis


- SCDs for now














<Ayaka Duvall - Last Filed: 02/20/19 11:30>





Objective





- Vital Signs/Intake and Output


Vital Signs (last 24 hours): 


                                        











Temp Pulse Resp BP Pulse Ox


 


 98.0 F   54 L  20   148/68   98 


 


 02/20/19 08:15  02/20/19 08:15  02/20/19 08:15  02/20/19 08:15  02/20/19 08:15











- Medications


Medications: 


                               Current Medications





Dexamethasone (Decadron Inj)  4 mg IVP Q8 [facility]


   Last Admin: 02/20/19 09:18 Dose:  4 mg


Docusate Sodium (Colace)  100 mg PO BID [facility]


   Last Admin: 02/20/19 09:17 Dose:  100 mg


Morphine Sulfate (Morphine)  2 mg IVP Q4 PRN


   PRN Reason: Pain, moderate (4-7)


   Last Admin: 02/20/19 09:22 Dose:  2 mg


Oxycodone/Acetaminophen (Percocet 5/325 Mg Tab)  1 tab PO Q6 PRN


   PRN Reason: Pain, moderate (4-7)


   Stop: 02/21/19 16:58


Pantoprazole Sodium (Protonix Ec Tab)  40 mg PO DAILY [facility]


   Last Admin: 02/20/19 09:18 Dose:  40 mg


Tramadol HCl (Ultram)  50 mg PO Q6 PRN


   PRN Reason: Pain, Mild (1-3)











- Labs


Labs: 


                                        





                                 02/17/19 19:00 





                                 02/17/19 19:00 





                                        











PT  11.6 Seconds (9.8-13.1)   02/18/19  12:57    


 


INR  1.0   02/18/19  12:57    


 


APTT  31.4 Seconds (25.6-37.1)   02/18/19  12:57    














Attending/Attestation





- Attestation


I have personally seen and examined this patient.: Yes


I have fully participated in the care of the patient.: Yes


I have reviewed all pertinent clinical information, including history, physical 

exam and plan: Yes


Notes (Text): 





02/20/19 11:30


agree with findings and plan as above.

## 2019-02-19 NOTE — CP.PCM.PN
Subjective





- Date & Time of Evaluation


Date of Evaluation: 02/19/19


Time of Evaluation: 13:54





- Subjective


Subjective: 





Neuro Follow-Up Note:





Mr. Gregory was evaluated this afternoon at bedside.  Significant other present.  

Pt still c/o lower back pain (tolerable at this time per pt) and right leg 

weakness and numbness.  He also c/o numbness and tingling to both hands.  Denies

h/a, dizziness, visual changes, chest pain, sob, abd pain, n/v/d.





Objective





- Vital Signs/Intake and Output


Vital Signs (last 24 hours): 


                                        











Temp Pulse Resp BP Pulse Ox


 


 98.2 F   55 L  19   123/58 L  99 


 


 02/19/19 08:18  02/19/19 08:18  02/19/19 08:18  02/19/19 08:18  02/19/19 08:18











- Medications


Medications: 


                               Current Medications





Dexamethasone (Decadron Inj)  4 mg IVP Q8 [facility]


Docusate Sodium (Colace)  100 mg PO BID [facility]


   Last Admin: 02/19/19 09:56 Dose:  100 mg


Morphine Sulfate (Morphine)  2 mg IVP Q4 PRN


   PRN Reason: Pain, moderate (4-7)


   Last Admin: 02/19/19 09:52 Dose:  2 mg


Oxycodone/Acetaminophen (Percocet 5/325 Mg Tab)  1 tab PO Q6 PRN


   PRN Reason: Pain, moderate (4-7)


   Stop: 02/21/19 16:58


Pantoprazole Sodium (Protonix Ec Tab)  40 mg PO DAILY [facility]


   Last Admin: 02/19/19 09:56 Dose:  40 mg


Tramadol HCl (Ultram)  50 mg PO Q6 PRN


   PRN Reason: Pain, Mild (1-3)











- Labs


Labs: 


                                        





                                 02/17/19 19:00 





                                 02/17/19 19:00 





                                        











PT  11.6 Seconds (9.8-13.1)   02/18/19  12:57    


 


INR  1.0   02/18/19  12:57    


 


APTT  31.4 Seconds (25.6-37.1)   02/18/19  12:57    














- Constitutional


Appears: Well, Non-toxic





- Head Exam


Head Exam: ATRAUMATIC, NORMAL INSPECTION, NORMOCEPHALIC





- Eye Exam


Eye Exam: EOMI, Normal appearance, PERRL


Pupil Exam: NORMAL ACCOMODATION, PERRL





- ENT Exam


ENT Exam: Mucous Membranes Moist





- Neck Exam


Neck Exam: Full ROM, Normal Inspection





- Respiratory Exam


Respiratory Exam: NORMAL BREATHING PATTERN





- Extremities Exam


Extremities Exam: absent: Calf Tenderness, Full ROM, Pedal Edema


Additional comments: 


Decreased movement to RLE; able to move RUE, LUE, and LLE but does have some 

weakness to BUE when assessing strength and .


+ right foot drop noted





- Neurological Exam


Neurological Exam: Alert, Awake, CN II-XII Intact, Oriented x3.  absent: 

Reflexes Normal


Additional comments: 





Speech clear


Strength: 


  RUE proximal strength 4/5,  4/5


  LUE proximal strength 4/5,  4/5


  RLE proximal strength 3-4/5, plantar flexion 2/5;  + foot drop


  LLE proximal strength 5/5, plantar flexion 5/5


Normal shoulder strength


Sensation intact throughout b/l


Vibration slightly decreased to BLE at the ankles


Hyperreflexia noted BLE


No tremors; clonus noted to BUE


Normal position sense throughout


Gait not assessed





- Psychiatric Exam


Psychiatric exam: Normal Affect, Normal Mood





- Skin


Skin Exam: Normal Color





Assessment and Plan





- Assessment and Plan (Free Text)


Assessment: 





A/P:





Mr. Gregory is a 47 y/o M who was admitted for worsening lower back pain and 

urinary incontinence for approx 2-3 weeks.  He was found to have a large disc 

herniation at the level of L5-S1 with significant compressive effects on thecal 

nerve (see report below).  





Imaging reviewed:





-C-Spine MRI (2/19/19): done today, results pending.


-Brain MRI (2/18/19): Slightly limited motion degraded study.  No acute 

intracranial hemorrhage or infarction.  No obvious parenchymal nor extra-axial 

masses 


 or collections.  No acute intracranial.


-L-Spine MRI (2/18/19): Slightly limited motion degraded study.  Large disc 

herniation with calcification of the posterior annulus L5-S1 level that extends 

below 


 and to a slightly lesser degree just above the level of the disc space and 

results in moderate to fairly significant compressive effects on the ventral 

surface of 


 the thecal sac as well as intrathecal and just ex exited extra thecal S1 nerve 

roots.  Small to medium-sized disc herniation L4-L5 level.  Broad-based disc 


 bulge- protrusion L3-L4 level as detailed above.


-L-Spine CT (2/17/19): 1.  No acute fracture, spondylolysis or 

spondylolisthesis. 2.  Moderate degenerative disc disease at L4-5 and L5-S1 with

partial 


 calcification of the discs posteriorly, worse at L5-S1 with a broad-based 

central disc protrusion, mild spinal canal stenosis, severe right and moderate 

to 


 severe left neural foraminal narrowing. 3.  Additional comments as described 

above. 








-Will f/u with C-Spine MRI results.


-Pt is scheduled for a laminectomy on Thursday, 2/21/19, with neurosurgery.


-Continue Decadron as ordered.


-Neuro checks Q shift and prn for new neurologic complaints/symptoms.


-Notify neuro team of any acute changes in pt's condition.





Mirela Duran, XIMENA, APN


Case discussed with Dr. Tamayo

## 2019-02-19 NOTE — CP.PCM.PN
Subjective





- Date & Time of Evaluation


Date of Evaluation: 02/19/19


Time of Evaluation: 10:24





- Subjective


Subjective: 





examined pt today


newfinding


spont clonus and spacticity


benjamin hamilton and clonus


MR brain unremarkable


will order MR c spine stat





Objective





- Vital Signs/Intake and Output


Vital Signs (last 24 hours): 


                                        











Temp Pulse Resp BP Pulse Ox


 


 98.2 F   55 L  19   123/58 L  99 


 


 02/19/19 08:18  02/19/19 08:18  02/19/19 08:18  02/19/19 08:18  02/19/19 08:18











- Medications


Medications: 


                               Current Medications





Dexamethasone (Decadron)  4 mg PO Q8 [facility]


   Last Admin: 02/19/19 09:56 Dose:  4 mg


Docusate Sodium (Colace)  100 mg PO BID [facility]


   Last Admin: 02/19/19 09:56 Dose:  100 mg


Morphine Sulfate (Morphine)  2 mg IVP Q4 PRN


   PRN Reason: Pain, moderate (4-7)


   Last Admin: 02/19/19 09:52 Dose:  2 mg


Oxycodone/Acetaminophen (Percocet 5/325 Mg Tab)  1 tab PO Q6 PRN


   PRN Reason: Pain, moderate (4-7)


   Stop: 02/21/19 16:58


Pantoprazole Sodium (Protonix Ec Tab)  40 mg PO DAILY [facility]


   Last Admin: 02/19/19 09:56 Dose:  40 mg


Tramadol HCl (Ultram)  50 mg PO Q6 PRN


   PRN Reason: Pain, Mild (1-3)











- Labs


Labs: 


                                        





                                 02/17/19 19:00 





                                 02/17/19 19:00 





                                        











PT  11.6 Seconds (9.8-13.1)   02/18/19  12:57    


 


INR  1.0   02/18/19  12:57    


 


APTT  31.4 Seconds (25.6-37.1)   02/18/19  12:57

## 2019-02-20 RX ADMIN — DEXAMETHASONE SODIUM PHOSPHATE SCH MG: 4 INJECTION, SOLUTION INTRAMUSCULAR; INTRAVENOUS at 09:18

## 2019-02-20 RX ADMIN — PANTOPRAZOLE SODIUM SCH MG: 40 TABLET, DELAYED RELEASE ORAL at 09:18

## 2019-02-20 RX ADMIN — DEXAMETHASONE SODIUM PHOSPHATE SCH MG: 4 INJECTION, SOLUTION INTRAMUSCULAR; INTRAVENOUS at 18:23

## 2019-02-20 RX ADMIN — DEXAMETHASONE SODIUM PHOSPHATE SCH MG: 4 INJECTION, SOLUTION INTRAMUSCULAR; INTRAVENOUS at 00:24

## 2019-02-20 NOTE — CP.PCM.PN
Subjective





- Date & Time of Evaluation


Date of Evaluation: 02/20/19


Time of Evaluation: 12:00





- Subjective


Subjective: 





Neuro Follow-Up Note:





Mr. Gregory was evaluated this afternoon at bedside.  Pt still c/o lower back pain

(8/10 now) and right leg weakness and numbness.  He is also still c/o numbness 

and tingling to both hands.  Was re-evaluated this afternoon by neurosurgery; 

for OR tomorrow.  Denies h/a, dizziness, visual changes, chest pain, sob, abd 

pain, n/v/d.








Objective





- Vital Signs/Intake and Output


Vital Signs (last 24 hours): 


                                        











Temp Pulse Resp BP Pulse Ox


 


 98.0 F   54 L  20   148/68   98 


 


 02/20/19 08:15  02/20/19 08:15  02/20/19 08:15  02/20/19 08:15  02/20/19 08:15











- Medications


Medications: 


                               Current Medications





Dexamethasone (Decadron Inj)  4 mg IVP Q8 [facility]


   Last Admin: 02/20/19 09:18 Dose:  4 mg


Docusate Sodium (Colace)  100 mg PO BID [facility]


   Last Admin: 02/20/19 09:17 Dose:  100 mg


Morphine Sulfate (Morphine)  2 mg IVP Q4 PRN


   PRN Reason: Pain, moderate (4-7)


   Last Admin: 02/20/19 09:22 Dose:  2 mg


Oxycodone/Acetaminophen (Percocet 5/325 Mg Tab)  1 tab PO Q6 PRN


   PRN Reason: Pain, moderate (4-7)


   Stop: 02/21/19 16:58


Pantoprazole Sodium (Protonix Ec Tab)  40 mg PO DAILY [facility]


   Last Admin: 02/20/19 09:18 Dose:  40 mg


Tramadol HCl (Ultram)  50 mg PO Q6 PRN


   PRN Reason: Pain, Mild (1-3)











- Labs


Labs: 


                                        





                                 02/17/19 19:00 





                                 02/17/19 19:00 





                                        











PT  11.6 Seconds (9.8-13.1)   02/18/19  12:57    


 


INR  1.0   02/18/19  12:57    


 


APTT  31.4 Seconds (25.6-37.1)   02/18/19  12:57    














- Constitutional


Appears: Well, Non-toxic





- Head Exam


Head Exam: ATRAUMATIC, NORMAL INSPECTION, NORMOCEPHALIC





- Eye Exam


Eye Exam: EOMI, Normal appearance, PERRL.  absent: Nystagmus


Pupil Exam: NORMAL ACCOMODATION, PERRL





- ENT Exam


ENT Exam: Mucous Membranes Moist





- Neck Exam


Neck Exam: Normal Inspection





- Respiratory Exam


Respiratory Exam: NORMAL BREATHING PATTERN





- Extremities Exam


Extremities Exam: absent: Calf Tenderness, Full ROM, Pedal Edema


Additional comments: 


Decreased movement to RLE; able to move RUE, LUE, and LLE but does have some 

weakness to BUE when assessing strength and .


+ right foot drop noted








- Back Exam


Back Exam: NORMAL INSPECTION





- Neurological Exam


Neurological Exam: Alert, Awake, CN II-XII Intact, Oriented x3


Additional comments: 


Speech clear


Strength: 


  RUE proximal strength 4/5,  4/5


  LUE proximal strength 4/5,  4/5


  RLE proximal strength 3-4/5, plantar flexion 2/5;  + foot drop


  LLE proximal strength 5/5, plantar flexion 5/5


Normal shoulder strength


Sensation intact throughout b/l


Hyperreflexia noted BLE


No tremors; no clonus noted to BUE today


Normal position sense throughout


Gait not assessed





- Psychiatric Exam


Psychiatric exam: Normal Affect, Normal Mood





- Skin


Skin Exam: Normal Color





Assessment and Plan





- Assessment and Plan (Free Text)


Assessment: 


A/P: 





Mr. Gregory is a 47 y/o M who was admitted for worsening lower back pain and 

urinary incontinence for approx 2-3 weeks.  He was found to have a large disc 

herniation at the level of L5-S1 with significant compressive effects on thecal 

nerve (see report below).  





Imaging reviewed:





-C-Spine MRI (2/19/19): Limited motion degraded study. No acute fractures.  

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  

There is a medium-sized central and bilateral left larger than right disc 

herniation at the C4-C5 level which extends with inferior subligamentous 

extension of disc over the most of the length of the C5 segment to near the C5-

C6 disc space level.  There is moderate to fairly significant cord compression 

of with gliosis and/or myelomalacia in the cervical cord at this level as 

described.  Multilevel degenerative spondylosis with varying degrees of canal 

and foraminal stenosis as described above. 


-Brain MRI (2/18/19): Slightly limited motion degraded study.  No acute 

intracranial hemorrhage or infarction.  No obvious parenchymal nor extra-axial 

masses 


 or collections.  No acute intracranial.


-L-Spine MRI (2/18/19): Slightly limited motion degraded study.  Large disc 

herniation with calcification of the posterior annulus L5-S1 level that extends 

below 


 and to a slightly lesser degree just above the level of the disc space and 

results in moderate to fairly significant compressive effects on the ventral 

surface of 


 the thecal sac as well as intrathecal and just ex exited extra thecal S1 nerve 

roots.  Small to medium-sized disc herniation L4-L5 level.  Broad-based disc 


 bulge- protrusion L3-L4 level as detailed above.


-L-Spine CT (2/17/19): 1.  No acute fracture, spondylolysis or 

spondylolisthesis. 2.  Moderate degenerative disc disease at L4-5 and L5-S1 with

partial 


 calcification of the discs posteriorly, worse at L5-S1 with a broad-based 

central disc protrusion, mild spinal canal stenosis, severe right and moderate 

to 


 severe left neural foraminal narrowing. 3.  Additional comments as described 

above. 








-Pt is scheduled for the OR tomorrow, Thursday 2/21/19, with neurosurgery for 

the c-spine cord compression.


-Continue Decadron as ordered.


-We are recommending bedrest and a hard c-collar--I have explained this to the 

pt and he verbalized understanding of this plan.


-Neuro checks Q shift and prn for new neurologic complaints/symptoms.


-Notify neuro team of any acute changes in pt's condition.





Mirela Duran, XIMENA, APN


Case discussed with Dr. Tamayo

## 2019-02-20 NOTE — CP.PCM.PN
Subjective





- Date & Time of Evaluation


Date of Evaluation: 02/20/19


Time of Evaluation: 13:18





- Subjective


Subjective: 





results of C spine mr discussed with patient


there is large area of myelomalacia under the c4/5 hnp


operative plan changed to do acdf and will revit lumbar laminectomy after neck 

is healed


told pt that myelomalacia will affect return of neuro functioning and overal 

recovery


He understands that surgy will not necessarily revers his complaints





Objective





- Vital Signs/Intake and Output


Vital Signs (last 24 hours): 


                                        











Temp Pulse Resp BP Pulse Ox


 


 98.0 F   54 L  20   148/68   98 


 


 02/20/19 08:15  02/20/19 08:15  02/20/19 08:15  02/20/19 08:15  02/20/19 08:15











- Medications


Medications: 


                               Current Medications





Dexamethasone (Decadron Inj)  4 mg IVP Q8 [facility]


   Last Admin: 02/20/19 09:18 Dose:  4 mg


Docusate Sodium (Colace)  100 mg PO BID [facility]


   Last Admin: 02/20/19 09:17 Dose:  100 mg


Morphine Sulfate (Morphine)  2 mg IVP Q4 PRN


   PRN Reason: Pain, moderate (4-7)


   Last Admin: 02/20/19 09:22 Dose:  2 mg


Oxycodone/Acetaminophen (Percocet 5/325 Mg Tab)  1 tab PO Q6 PRN


   PRN Reason: Pain, moderate (4-7)


   Stop: 02/21/19 16:58


Pantoprazole Sodium (Protonix Ec Tab)  40 mg PO DAILY [facility]


   Last Admin: 02/20/19 09:18 Dose:  40 mg


Tramadol HCl (Ultram)  50 mg PO Q6 PRN


   PRN Reason: Pain, Mild (1-3)











- Labs


Labs: 


                                        





                                 02/17/19 19:00 





                                 02/17/19 19:00 





                                        











PT  11.6 Seconds (9.8-13.1)   02/18/19  12:57    


 


INR  1.0   02/18/19  12:57    


 


APTT  31.4 Seconds (25.6-37.1)   02/18/19  12:57

## 2019-02-20 NOTE — CP.PCM.PN
<Dillon Corley - Last Filed: 02/20/19 14:16>





Subjective





- Date & Time of Evaluation


Date of Evaluation: 02/20/19


Time of Evaluation: 09:40





- Subjective


Subjective: 


47 yo M with medical hx of bulging spinal disc admitted due to intractable back 

pain and neurological changes. 





Patient seen and evaluated at bedside. No acute events overnight. Patient 

sitting in bed with mild distress. Continues to have low back pain, RLE weakness

and B/L hand numbness and  weakness. Pain is partially controlled with pain 

medications. Tolerating diet well PO. Denies CP, SOB, headache, dizziness, 

vision changes, nausea, vomiting, bowel incontinence. Likely for OR tomorrow.





Objective





- Vital Signs/Intake and Output


Vital Signs (last 24 hours): 


                                        











Temp Pulse Resp BP Pulse Ox


 


 98.0 F   54 L  20   148/68   98 


 


 02/20/19 08:15  02/20/19 08:15  02/20/19 08:15  02/20/19 08:15  02/20/19 08:15











- Medications


Medications: 


                               Current Medications





Dexamethasone (Decadron Inj)  4 mg IVP Q8 [facility]


   Last Admin: 02/20/19 09:18 Dose:  4 mg


Docusate Sodium (Colace)  100 mg PO BID [facility]


   Last Admin: 02/20/19 09:17 Dose:  100 mg


Morphine Sulfate (Morphine)  2 mg IVP Q4 PRN


   PRN Reason: Pain, moderate (4-7)


   Last Admin: 02/20/19 09:22 Dose:  2 mg


Oxycodone/Acetaminophen (Percocet 5/325 Mg Tab)  1 tab PO Q6 PRN


   PRN Reason: Pain, moderate (4-7)


   Stop: 02/21/19 16:58


Pantoprazole Sodium (Protonix Ec Tab)  40 mg PO DAILY [facility]


   Last Admin: 02/20/19 09:18 Dose:  40 mg


Tramadol HCl (Ultram)  50 mg PO Q6 PRN


   PRN Reason: Pain, Mild (1-3)











- Labs


Labs: 


                                        





                                 02/17/19 19:00 





                                 02/17/19 19:00 





                                        











PT  11.6 Seconds (9.8-13.1)   02/18/19  12:57    


 


INR  1.0   02/18/19  12:57    


 


APTT  31.4 Seconds (25.6-37.1)   02/18/19  12:57    














- Constitutional


Appears: Well, No Acute Distress





- Head Exam


Head Exam: ATRAUMATIC, NORMAL INSPECTION, NORMOCEPHALIC





- Eye Exam


Eye Exam: EOMI, Normal appearance





- ENT Exam


ENT Exam: Mucous Membranes Moist





- Neck Exam


Neck Exam: absent: Tenderness





- Respiratory Exam


Respiratory Exam: Clear to Ausculation Bilateral, NORMAL BREATHING PATTERN.  

absent: Rales, Rhonchi, Wheezes





- Cardiovascular Exam


Cardiovascular Exam: REGULAR RHYTHM, +S1, +S2.  absent: Murmur





- GI/Abdominal Exam


GI & Abdominal Exam: Soft, Normal Bowel Sounds.  absent: Tenderness





- Extremities Exam


Extremities Exam: absent: Pedal Edema, Tenderness





- Neurological Exam


Neurological Exam: Abnormal Gait, Alert, Altered, Awake, Motor Sensory Deficit, 

Oriented x3


Neuro motor strength exam: Left Lower Extremity: 4, Right Lower Extremity: 3





- Skin


Skin Exam: Dry, Intact, Normal Color, Warm





Assessment and Plan





- Assessment and Plan (Free Text)


Assessment: 


47 yo M with medical hx of bulging spinal disk admitted due to intractable back 

pain and neurological changes, including urinary incontinence x 2 weeks.





CT Lumbar Spine w/ and w/o contrast: Moderate central spinal canal stenosis at 

L4-5; marginal osteophytic spurring throughout the lumbar spine 


MRI L spine: Large disc herniation with calcification of the posterior annulus 

L5-S1 level, significant compressive effects on the ventral surface of the 

thecal sac as well as intrathecal and just ex exited extra thecal S1 nerve 

roots.  Small to medium-sized disc herniation L4-L5 level. Broad-based disc 

bulge- protrusion L3-L4 level.


MRI Brain: Unremarkable


MRI C- Spine: No acute fractures.  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 


There is a medium-sized central and bilateral left larger than right disc 

herniation at the C4-C5 level which extends with inferior subligamentous 

extension of disc over the most of the length of the C5 segment to near the C5-

C6 disc space level.  There is moderate to fairly significant cord compression 

of with gliosis and/or myelomalacia in the cervical cord at this level as 

described. Multilevel degenerative spondylosis








Plan: 


Lower back pain with neurological changes


- MRI C spine and L spine consistent with Cervical and lumbar disc herniation 

with canal stenosis.


- On pain control w/Tramadol, Percocet and Morphine 2 mg IV


- Continue Dexamethazone 4 mg IVP Q8hr


- Neurosurgery consult on board - Dr. Ambriz; recs appreciated.


- Preop labs PT/PTT/INR, CXR, EKG unremarkable.


- Neurochecks Q shift


- Monitor for acute neurologic changes.


- Surgery plan changed to ACDF instead of laminectomy tomorrow.





B/L upper extremity weakness


- MRI brain: Unremarkable


- MRI C-spine consistent with cervical disc herniation, cervical canal stenosis 

and cord comprssion.


- Neurology consulted, Dr. Tamayo, Recs appreciated.


- Neurosurgery on board, Recs appreciated


- Surgery plan changed to ACDF instead of laminectomy tomorrow.





Diet


- Regular diet, NPO past midnight.





DVT prophylaxis


- SCDs for now








<Sandy Smyth - Last Filed: 02/20/19 17:37>





Objective





- Vital Signs/Intake and Output


Vital Signs (last 24 hours): 


                                        











Temp Pulse Resp BP Pulse Ox


 


 98.3 F   66   18   112/57 L  97 


 


 02/20/19 16:28  02/20/19 16:28  02/20/19 16:28  02/20/19 16:28  02/20/19 16:28











- Medications


Medications: 


                               Current Medications





Dexamethasone (Decadron Inj)  4 mg IVP Q8 [facility]


   Last Admin: 02/20/19 09:18 Dose:  4 mg


Docusate Sodium (Colace)  100 mg PO BID [facility]


   Last Admin: 02/20/19 09:17 Dose:  100 mg


Morphine Sulfate (Morphine)  2 mg IVP Q4 PRN


   PRN Reason: Pain, moderate (4-7)


   Last Admin: 02/20/19 13:52 Dose:  2 mg


Oxycodone/Acetaminophen (Percocet 5/325 Mg Tab)  1 tab PO Q6 PRN


   PRN Reason: Pain, moderate (4-7)


   Stop: 02/21/19 16:58


Pantoprazole Sodium (Protonix Ec Tab)  40 mg PO DAILY [facility]


   Last Admin: 02/20/19 09:18 Dose:  40 mg


Tramadol HCl (Ultram)  50 mg PO Q6 PRN


   PRN Reason: Pain, Mild (1-3)











- Labs


Labs: 


                                        





                                 02/17/19 19:00 





                                 02/17/19 19:00 





                                        











PT  11.6 Seconds (9.8-13.1)   02/18/19  12:57    


 


INR  1.0   02/18/19  12:57    


 


APTT  31.4 Seconds (25.6-37.1)   02/18/19  12:57    














Attending/Attestation





- Attestation


I have personally seen and examined this patient.: Yes


I have fully participated in the care of the patient.: Yes


I have reviewed all pertinent clinical information, including history, physical 

exam and plan: Yes


Notes (Text): 








Cervical Disc Herniation with Myelopathy/Cord Compression


 Large Lumbar Disc Herniation /Protrusion with compressive effect





- Pain mgt


- cont IV Decadron


-Neck Collar


-Neurology and Neurosurgery consulted


- plan for Anterior  Cervical Discectomy and Fusion tomorrw


- Pt is medical optimized for surgery, he has  good cardiac function , greater 

than 4 mets, low cardiac risk for proposed surgery - (prior to neurro deficits ,

pt played football and works  doing  Construction/Demolition )

## 2019-02-21 LAB
ALBUMIN SERPL-MCNC: 4.5 G/DL (ref 3.5–5)
ALBUMIN/GLOB SERPL: 1.3 {RATIO} (ref 1–2.1)
ALT SERPL-CCNC: 38 U/L (ref 21–72)
AST SERPL-CCNC: 22 U/L (ref 17–59)
BASOPHILS # BLD AUTO: 0 K/UL (ref 0–0.2)
BASOPHILS NFR BLD: 0.1 % (ref 0–2)
BUN SERPL-MCNC: 26 MG/DL (ref 9–20)
CALCIUM SERPL-MCNC: 9.7 MG/DL (ref 8.4–10.2)
EOSINOPHIL # BLD AUTO: 0 K/UL (ref 0–0.7)
EOSINOPHIL NFR BLD: 0 % (ref 0–4)
ERYTHROCYTE [DISTWIDTH] IN BLOOD BY AUTOMATED COUNT: 13.6 % (ref 11.5–14.5)
GFR NON-AFRICAN AMERICAN: > 60
HGB BLD-MCNC: 14.7 G/DL (ref 12–18)
LYMPHOCYTE: 14 % (ref 20–50)
LYMPHOCYTES # BLD AUTO: 1.1 K/UL (ref 1–4.3)
LYMPHOCYTES NFR BLD AUTO: 8.9 % (ref 20–40)
MCH RBC QN AUTO: 29 PG (ref 27–31)
MCHC RBC AUTO-ENTMCNC: 32.9 G/DL (ref 33–37)
MCV RBC AUTO: 88.1 FL (ref 80–94)
MONOCYTE: 4 % (ref 0–10)
MONOCYTES # BLD: 0.5 K/UL (ref 0–0.8)
MONOCYTES NFR BLD: 4.3 % (ref 0–10)
NEUTROPHILS # BLD: 11.2 K/UL (ref 1.8–7)
NEUTROPHILS NFR BLD AUTO: 82 % (ref 42–75)
NEUTROPHILS NFR BLD AUTO: 86.7 % (ref 50–75)
NRBC BLD AUTO-RTO: 0.1 % (ref 0–0)
PLATELET # BLD EST: NORMAL 10*3/UL
PLATELET # BLD: 268 K/UL (ref 130–400)
PMV BLD AUTO: 8.4 FL (ref 7.2–11.7)
RBC # BLD AUTO: 5.05 MIL/UL (ref 4.4–5.9)
RBC MORPH BLD: NORMAL
TOTAL CELLS COUNTED BLD: 100
WBC # BLD AUTO: 12.9 K/UL (ref 4.8–10.8)

## 2019-02-21 PROCEDURE — 0RB30ZZ EXCISION OF CERVICAL VERTEBRAL DISC, OPEN APPROACH: ICD-10-PCS

## 2019-02-21 PROCEDURE — 07DS3ZZ EXTRACTION OF VERTEBRAL BONE MARROW, PERCUTANEOUS APPROACH: ICD-10-PCS

## 2019-02-21 PROCEDURE — 0RG10A0 FUSION OF CERVICAL VERTEBRAL JOINT WITH INTERBODY FUSION DEVICE, ANTERIOR APPROACH, ANTERIOR COLUMN, OPEN APPROACH: ICD-10-PCS

## 2019-02-21 RX ADMIN — DEXAMETHASONE SODIUM PHOSPHATE SCH MG: 4 INJECTION, SOLUTION INTRAMUSCULAR; INTRAVENOUS at 01:49

## 2019-02-21 RX ADMIN — HYDROMORPHONE HYDROCHLORIDE PRN MG: 1 INJECTION, SOLUTION INTRAMUSCULAR; INTRAVENOUS; SUBCUTANEOUS at 12:45

## 2019-02-21 RX ADMIN — DEXAMETHASONE SODIUM PHOSPHATE SCH: 4 INJECTION, SOLUTION INTRAMUSCULAR; INTRAVENOUS at 09:24

## 2019-02-21 RX ADMIN — DEXAMETHASONE SODIUM PHOSPHATE SCH MG: 4 INJECTION, SOLUTION INTRAMUSCULAR; INTRAVENOUS at 18:11

## 2019-02-21 RX ADMIN — HYDROMORPHONE HYDROCHLORIDE PRN MG: 1 INJECTION, SOLUTION INTRAMUSCULAR; INTRAVENOUS; SUBCUTANEOUS at 12:30

## 2019-02-21 RX ADMIN — HYDROMORPHONE HYDROCHLORIDE PRN MG: 1 INJECTION, SOLUTION INTRAMUSCULAR; INTRAVENOUS; SUBCUTANEOUS at 13:45

## 2019-02-21 RX ADMIN — PANTOPRAZOLE SODIUM SCH: 40 TABLET, DELAYED RELEASE ORAL at 09:25

## 2019-02-21 RX ADMIN — MORPHINE SULFATE PRN MG: 5 INJECTION, SOLUTION INTRAMUSCULAR; INTRAVENOUS at 21:48

## 2019-02-21 NOTE — CP.PCM.PN
Subjective





- Date & Time of Evaluation


Date of Evaluation: 02/21/19


Time of Evaluation: 14:20





- Subjective


Subjective: 


Patient seen and evaluated at bedside, S/P ACDF surgery today, POD 0 in recovery

room. patient drowsy, but easily arousable. AAO x 3. Neck collor present. 

Patient reports dry mouth but denies any CP, SOB, abdominal pain, nausea, 

vomiting. Patient will be transferred to [facility] later today.








Objective





- Vital Signs/Intake and Output


Vital Signs (last 24 hours): 


                                        











Temp Pulse Resp BP Pulse Ox


 


 97.2 F L  45 L  18   144/84   100 


 


 02/21/19 12:00  02/21/19 13:45  02/21/19 13:45  02/21/19 13:45  02/21/19 13:45








Intake and Output: 


                                        











 02/21/19 02/21/19





 06:59 18:59


 


Intake Total  700


 


Balance  700














- Medications


Medications: 


                               Current Medications





Dexamethasone (Decadron Inj)  4 mg IVP Q8 [facility]


   Last Admin: 02/21/19 09:24 Dose:  Not Given


Docusate Sodium (Colace)  100 mg PO BID [facility]


   Last Admin: 02/21/19 09:24 Dose:  Not Given


Dextrose/Sodium Chloride (Dextrose 5%-0.45% Ns 500 Ml)  500 mls @ 60 mls/hr IV 

.Q8H20M [facility]


   Stop: 02/22/19 12:06


Morphine Sulfate (Morphine)  2 mg IVP Q4 PRN


   PRN Reason: Pain, moderate (4-7)


   Last Admin: 02/20/19 13:52 Dose:  2 mg


Oxycodone/Acetaminophen (Percocet 5/325 Mg Tab)  1 tab PO Q6 PRN


   PRN Reason: Pain, moderate (4-7)


   Stop: 02/21/19 16:58


Oxycodone/Acetaminophen (Percocet 5/325 Mg Tab)  1 tab PO Q4 PRN


   PRN Reason: Pain, Mild (1-3)


   Stop: 02/24/19 12:06


Pantoprazole Sodium (Protonix Ec Tab)  40 mg PO DAILY [facility]


   Last Admin: 02/21/19 09:25 Dose:  Not Given


Tramadol HCl (Ultram)  50 mg PO Q6 PRN


   PRN Reason: Pain, Mild (1-3)











- Labs


Labs: 


                                        





                                 02/21/19 08:36 





                                 02/21/19 08:36 





                                        











PT  11.6 Seconds (9.8-13.1)   02/18/19  12:57    


 


INR  1.0   02/18/19  12:57    


 


APTT  31.4 Seconds (25.6-37.1)   02/18/19  12:57    














- Constitutional


Appears: Well, No Acute Distress





- Head Exam


Head Exam: ATRAUMATIC, NORMAL INSPECTION, NORMOCEPHALIC





- Eye Exam


Eye Exam: EOMI, Normal appearance





- ENT Exam


ENT Exam: Mucous Membranes Moist





- Respiratory Exam


Respiratory Exam: Clear to Ausculation Bilateral, NORMAL BREATHING PATTERN.  

absent: Respiratory Distress





- Cardiovascular Exam


Cardiovascular Exam: REGULAR RHYTHM, +S1, +S2





- GI/Abdominal Exam


GI & Abdominal Exam: Soft, Normal Bowel Sounds.  absent: Tenderness





- Neurological Exam


Neurological Exam: Alert, Awake, Oriented x3





- Skin


Skin Exam: Dry, Intact, Normal Color, Warm





Assessment and Plan





- Assessment and Plan (Free Text)


Assessment: 


47 yo M with medical hx of bulging spinal disk admitted due to intractable back 

pain with neurological changes, upper extremity weakness including urinary 

incontinence x 2 weeks.





CT Lumbar Spine w/ and w/o contrast: Moderate central spinal canal stenosis at 

L4-5; marginal osteophytic spurring throughout the lumbar spine 


MRI L spine: Large disc herniation with calcification of the posterior annulus 

L5-S1 level, significant compressive effects on the ventral surface of the 

thecal sac as well as intrathecal and just ex exited extra thecal S1 nerve 

roots.  Small to medium-sized disc herniation L4-L5 level. Broad-based disc 

bulge- protrusion L3-L4 level.


MRI Brain: Unremarkable


MRI C- Spine: No acute fractures.  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 


There is a medium-sized central and bilateral left larger than right disc 

herniation at the C4-C5 level which extends with inferior subligamentous 

extension of disc over the most of the length of the C5 segment to near the C5-

C6 disc space level.  There is moderate to fairly significant cord compression 

of with gliosis and/or myelomalacia in the cervical cord at this level as 

described. Multilevel degenerative spondylosis








Plan: 


 Cervical and lumbar disc herniation with canal stenosis


- S/P ACDF surgery on 2/21, POD 0


- On pain control w/Tramadol, Percocet and Morphine 2 mg IV


- Continue Dexamethazone 4 mg IVP Q8hr


- Neurosurgery consult on board - Dr. Ambriz; recs appreciated.


- Neurochecks Q shift


- Neck collar 


- Continue D5 0.45 NS


- Monitor for acute neurologic changes.





Diet


- Regular diet





DVT prophylaxis


- SCDs for now


- Lovenox 40 SC from tomorrow AM





GI prophylaxis


- Pantoprazole 40 po daily

## 2019-02-21 NOTE — CP.PCM.PCO
Addendum


Addendum: 


Resident Dr. Corley and Dr. Kelly notified by nurse that patient was given wrong 

medication(Versed 2 mg IV instead of Morphine 2 mg IV). Patient seen and 

examined at bedside. Patient awake alert and oriented. Patient sitting in 

bedside, drowsy but able to carryout conversation. Reports sore throat since 

surgery but denies any CP, SOB, headache, nausea, vomiting. Patient currently 

NPO with next meal/dinner at 4:30. On physical exam patient AAO, following 

commands, speaks in full sentences, drowsy but abusable. Heart: RRR, S1S2 

present. Lungs: CTAB, good air entry, No rhonchi, rales, wheezing. 


Dr. Ambrose notified. Patient started on 2L O2 NC, NS 1L @ 150 ml/hr, monitor 

vitals Q1hr. Monitor respiratory status. Hold regular diet at 16:30. Monitor for

any acute changes.





Case discussed with Dr. Ambrose.





02/21/19 16:44

## 2019-02-22 LAB
BASOPHILS # BLD AUTO: 0 K/UL (ref 0–0.2)
BASOPHILS NFR BLD: 0.1 % (ref 0–2)
BUN SERPL-MCNC: 23 MG/DL (ref 9–20)
CALCIUM SERPL-MCNC: 9.5 MG/DL (ref 8.4–10.2)
EOSINOPHIL # BLD AUTO: 0 K/UL (ref 0–0.7)
EOSINOPHIL NFR BLD: 0 % (ref 0–4)
ERYTHROCYTE [DISTWIDTH] IN BLOOD BY AUTOMATED COUNT: 13.4 % (ref 11.5–14.5)
GFR NON-AFRICAN AMERICAN: > 60
HGB BLD-MCNC: 14.5 G/DL (ref 12–18)
LYMPHOCYTES # BLD AUTO: 1.2 K/UL (ref 1–4.3)
LYMPHOCYTES NFR BLD AUTO: 10 % (ref 20–40)
MCH RBC QN AUTO: 29.1 PG (ref 27–31)
MCHC RBC AUTO-ENTMCNC: 32.8 G/DL (ref 33–37)
MCV RBC AUTO: 88.6 FL (ref 80–94)
MONOCYTES # BLD: 1 K/UL (ref 0–0.8)
MONOCYTES NFR BLD: 7.7 % (ref 0–10)
NEUTROPHILS # BLD: 10.2 K/UL (ref 1.8–7)
NEUTROPHILS NFR BLD AUTO: 82.2 % (ref 50–75)
NRBC BLD AUTO-RTO: 0.1 % (ref 0–0)
PLATELET # BLD: 246 K/UL (ref 130–400)
PMV BLD AUTO: 8.5 FL (ref 7.2–11.7)
RBC # BLD AUTO: 4.98 MIL/UL (ref 4.4–5.9)
WBC # BLD AUTO: 12.4 K/UL (ref 4.8–10.8)

## 2019-02-22 RX ADMIN — PURIFIED WATER PRN LOZ: 99.05 LIQUID OPHTHALMIC at 13:25

## 2019-02-22 RX ADMIN — POLYETHYLENE GLYCOL 3350 ONE GM: 17 POWDER, FOR SOLUTION ORAL at 14:46

## 2019-02-22 RX ADMIN — DEXAMETHASONE SODIUM PHOSPHATE SCH MG: 4 INJECTION, SOLUTION INTRAMUSCULAR; INTRAVENOUS at 17:44

## 2019-02-22 RX ADMIN — DEXAMETHASONE SODIUM PHOSPHATE SCH MG: 4 INJECTION, SOLUTION INTRAMUSCULAR; INTRAVENOUS at 01:33

## 2019-02-22 RX ADMIN — ENOXAPARIN SODIUM SCH MG: 40 INJECTION SUBCUTANEOUS at 08:33

## 2019-02-22 RX ADMIN — OXYCODONE HYDROCHLORIDE AND ACETAMINOPHEN PRN TAB: 5; 325 TABLET ORAL at 04:42

## 2019-02-22 RX ADMIN — PANTOPRAZOLE SODIUM SCH MG: 40 TABLET, DELAYED RELEASE ORAL at 08:34

## 2019-02-22 RX ADMIN — OXYCODONE HYDROCHLORIDE AND ACETAMINOPHEN PRN TAB: 5; 325 TABLET ORAL at 21:07

## 2019-02-22 RX ADMIN — DEXAMETHASONE SODIUM PHOSPHATE SCH MG: 4 INJECTION, SOLUTION INTRAMUSCULAR; INTRAVENOUS at 08:33

## 2019-02-22 RX ADMIN — POLYETHYLENE GLYCOL 3350 ONE: 17 POWDER, FOR SOLUTION ORAL at 16:00

## 2019-02-22 NOTE — OP
.Left message with patient to call the office back regarding recent results.     PROCEDURE DATE:  02/21/2019



PREOPERATIVE DIAGNOSIS:  Cervical myelopathy secondary to herniated disk

C4-C5.



POSTOPERATIVE DIAGNOSIS:  Cervical myelopathy secondary to herniated disk

C4-C5.



PROCEDURE:

1.  Anterior cervical disk excision C4-C5.

2.  Anterior cervical fusion C4-C5.

3.  Use of intravertebral device.

4.  Use of spinal instrumentation.

5.  Use of autograft by means of bone marrow aspiration.



SURGEON:  Tiago Arzate MD and Rupesh Ambriz MD



TYPE OF ANESTHESIA:  General endotracheal tube intubation.



DESCRIPTION OF PROCEDURE:  The patient was brought into the operating room

and placed on the operating room table in the supine position.  General

anesthesia was achieved and the patient was intubated without any excessive

posturing of his neck.  Intravenous antibiotics were administered.  Spinal

cord monitoring leads were placed throughout the patient's body.  Real-time

monitoring was done by technician in the room, remote monitoring done by a

physician as well.  The baseline tracing did not reveal any motor tracings

at all and again this was before any of the procedure had been undertaken. 

SSEPs remained intact.  The patient's arms are well padded and secured at

the side.  Sequential compression boots were placed to each of the

patient's leg.  A small roll was placed beneath the patient's scapula to

obtain some gentle extension and the patient's neck was sterilely prepped

and draped.  The level of the incision was noted under fluoroscopy and

infiltrated with Lidocaine with epinephrine.  An incision was made sharply

from the midline to the right along the Justine's line and taken down to

subcutaneous tissue using sharp and blunt dissection.  Hemostasis was

achieved using electrocautery.  The platysma was divided in a vertical

fashion and mobilized in all directions and a plane bluntly developed down

to the anterior cervical vertebral bodies.   The carotid was identified and

gently retracted laterally.  The midline structures taken medially. 

Prevertebral fascia was removed using Metzenbaum scissor and peanut sponges

and the spinal needle was placed in the disk space that was there and it

was confirmed that this was the C4-C5 level.  As the needle was removed,

the annulus was marked and the longus colli muscle was cauterized and

stripped back with a small key elevator.  Self-retaining Trimline

retractors were placed in the wound.  We had excellent visualization of the

operative field.  Another fluoroscopy view was taken with the spinal needle

and the disk and again verified we are at the C4-C5 level.  At that time,

the endotracheal cuff was deflated, re-inflated minimally to help avoid

compression injuries to the surrounding structures.



The annulus was incised.  A rongeur was used to remove large anterior

osteophyte off the inferior part of C4.  Small straight and angled curettes

were used to remove disk material along with a pituitary rongeur.  Small

lamina spreaders were placed in the disk space sequentially side to side. 

We were at the posterior margin of the disk, at which time the operating

microscope was brought on to the field and rest of the decompression done

under microscopic visualization.



Then, 1 to 2 mm Kerrison rongeurs were then used to remove any remaining

vestiges of the disk and annulus along the posterior longitudinal ligament.

Large free fragment could be seen centrally impacted against the cord. 

Once that was removed, we could visualize the ventral surface directly

indicative of the tear completely through the posterior longitudinal

ligament.  The rest of the ventral surface was then decompressed as

mentioned.  This was done until we can easily pass a blunt tip nerve hook

at each neural foramina as well as beneath the vertebral bodies above and

below.  A few more free fragments were removed and then the dura was

completely decompressed.  The disk space was copiously irrigated with

antibiotic solution.  Hemostasis was achieved with thrombinated Gelfoam

powder.



We then proceeded with the fusion part of the procedure.  It should be

noted that throughout the case motor stimulation again revealed no

activity, there were no change in SSEPs as the case proceeded.



A needle was placed in the C5 vertebral body and bone marrow was aspirated

and used to soak a cube of Conform sponge.  Thrombinated Gelfoam powder was

used for hemostasis.  Trials were done and size 5 large was found to be the

best fit.  The appropriate rasp was used while the canal of the cage was

packed with marrow soaked Conform and coated with Optium gel.  The cage was

then tamped into place and countersunk.  A 60 mm Acromed Uniplate was

affixed to the vertebral bodies using 60 mm self-drilling screws.  Final

fluoroscopic view showed excellent position of the intravertebral device as

well as the hardware.  The screws were locked into place and the wound

copiously irrigated with antibiotic solution.  Hemostasis was achieved with

bipolar cautery as well as thrombinated Gelfoam powder.  The wound was then

closed in layers with interrupted sutures of 3-0 Vicryl as well as platysma

and subcutaneous tissue and a running subcuticular suture of 4-0 Monocryl

for the skin.  Dermabond was applied and once that was dried, the patient

was placed back in a soft cervical collar.  The patient was awakened and

extubated.  He was transferred to a stretcher and taken to the recovery

room in stable condition.  He tolerated the procedure well.  He was

actively moving all the extremities at the time of his transfer and no

permanent electrophysiologic abnormalities were noted at the completion of

the case.  Estimated blood loss was 30 mL.  He received 700 mL of

crystalloid during the operation.





__________________________________________

Tiago Arzate MD





DD:  02/21/2019 12:23:55

DT:  02/21/2019 13:27:31

Job # [id number]

## 2019-02-22 NOTE — CP.PCM.PN
Subjective





- Date & Time of Evaluation


Date of Evaluation: 02/22/19


Time of Evaluation: 13:30





- Subjective


Subjective: 





Neuro Follow-Up Note:





Mr. Gregory was evaluated this afternoon at bedside.  Family at bedside.  Pt is 

POD 1 of C4-C5 anterior cervical discectomy with fusion.  He reports a sore 

throat today.  Admits that his weakness to arms and legs is better since the 

procedure.  Adriel nto lower back now 5/10.  He is also still c/o numbness and 

tingling to both hands but this has improved since the procedure.


Denies h/a, dizziness, visual changes, chest pain, palpitations, sob, cough, abd

pain, n/v/d.








Objective





- Vital Signs/Intake and Output


Vital Signs (last 24 hours): 


                                        











Temp Pulse Resp BP Pulse Ox


 


 97.7 F   59 L  18   131/72   94 L


 


 02/22/19 09:00  02/22/19 09:00  02/22/19 09:00  02/22/19 00:36  02/22/19 09:00











- Medications


Medications: 


                               Current Medications





Benzocaine/Menthol (Cepacol Sore Throat)  1 saul PO Q2 PRN


   PRN Reason: Sore Throat


   Last Admin: 02/22/19 13:25 Dose:  1 saul


Dexamethasone (Decadron Inj)  4 mg IVP Q8 [facility]


   Last Admin: 02/22/19 08:33 Dose:  4 mg


Docusate Sodium (Colace)  100 mg PO BID [facility]


   Last Admin: 02/22/19 08:31 Dose:  100 mg


Enoxaparin Sodium (Lovenox)  40 mg SC DAILY [facility]; Protocol


   Last Admin: 02/22/19 08:33 Dose:  40 mg


Morphine Sulfate (Morphine)  2 mg IVP Q4 PRN


   PRN Reason: Pain, moderate (4-7)


   Last Admin: 02/21/19 21:48 Dose:  2 mg


Oxycodone/Acetaminophen (Percocet 5/325 Mg Tab)  1 tab PO Q4 PRN


   PRN Reason: Pain, Mild (1-3)


   Stop: 02/24/19 12:06


   Last Admin: 02/22/19 04:42 Dose:  1 tab


Pantoprazole Sodium (Protonix Ec Tab)  40 mg PO DAILY [facility]


   Last Admin: 02/22/19 08:34 Dose:  40 mg


Tramadol HCl (Ultram)  50 mg PO Q6 PRN


   PRN Reason: Pain, Mild (1-3)











- Labs


Labs: 


                                        





                                 02/22/19 07:41 





                                 02/22/19 07:41 





                                        











PT  11.6 Seconds (9.8-13.1)   02/18/19  12:57    


 


INR  1.0   02/18/19  12:57    


 


APTT  31.4 Seconds (25.6-37.1)   02/18/19  12:57    














- Constitutional


Appears: Well, Non-toxic, No Acute Distress





- Head Exam


Head Exam: ATRAUMATIC, NORMAL INSPECTION, NORMOCEPHALIC





- Eye Exam


Eye Exam: EOMI, Normal appearance, PERRL


Pupil Exam: NORMAL ACCOMODATION, PERRL





- ENT Exam


ENT Exam: Mucous Membranes Moist





- Neck Exam


Neck Exam: absent: Full ROM


Additional comments: 





dressing to surgical site in place


soft c-collar maintained on





- Respiratory Exam


Respiratory Exam: NORMAL BREATHING PATTERN





- Extremities Exam


Extremities Exam: absent: Calf Tenderness, Full ROM, Pedal Edema


Additional comments: 


Decreased movement to RLE; able to move RUE, LUE, and LLE.  Weakness present 

earlier this week to BUE has resolved.











- Neurological Exam


Neurological Exam: Alert, Awake, CN II-XII Intact, Oriented x3


Neuro motor strength exam: Left Upper Extremity: 5 ( 5/5), Right Upper 

Extremity: 5 ( 5/5), Left Lower Extremity: 5 (plantar flexion 5/5), Right 

Lower Extremity: 4 (plantar flexion 2-3/5)


Additional comments: 


Speech clear


Normal shoulder strength


Decreased movement to RLE; able to move RUE, LUE, and LLE.  Overall strength to 

BUE improved since earlier this week. 


Sensation intact throughout b/l


Hyperreflexia noted BLE


No tremors


Gait not assessed








- Psychiatric Exam


Psychiatric exam: Normal Affect, Normal Mood





- Skin


Skin Exam: Normal Color





Assessment and Plan





- Assessment and Plan (Free Text)


Assessment: 


A/P: 





Mr. Gregory is a 47 y/o M who was admitted for worsening lower back pain and 

urinary incontinence for approx 2-3 weeks.  He was found to have a large disc 

herniation at the level of L5-S1 with significant compressive effects on thecal 

nerve (see report below).  Today he is POD 1 of C4-C5 anterior cervical 

discectomy with fusion.





Imaging reviewed:





-C-Spine MRI (2/19/19): Limited motion degraded study. No acute fractures.  

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  

There is a medium-sized central and bilateral left larger than right disc 

herniation at the C4-C5 level which extends with inferior subligamentous 

extension of disc over the most of the length of the C5 segment to near the C5-

C6 disc space level.  There is moderate to fairly significant cord compression 

of with gliosis and/or myelomalacia in the cervical cord at this level as 

described.  Multilevel degenerative spondylosis with varying degrees of canal 

and foraminal stenosis as described above. 


-Brain MRI (2/18/19): Slightly limited motion degraded study.  No acute 

intracranial hemorrhage or infarction.  No obvious parenchymal nor extra-axial 

masses 


 or collections.  No acute intracranial.


-L-Spine MRI (2/18/19): Slightly limited motion degraded study.  Large disc 

herniation with calcification of the posterior annulus L5-S1 level that extends 

below 


 and to a slightly lesser degree just above the level of the disc space and 

results in moderate to fairly significant compressive effects on the ventral 

surface of 


 the thecal sac as well as intrathecal and just ex exited extra thecal S1 nerve 

roots.  Small to medium-sized disc herniation L4-L5 level.  Broad-based disc 


 bulge- protrusion L3-L4 level as detailed above.


-L-Spine CT (2/17/19): 1.  No acute fracture, spondylolysis or 

spondylolisthesis. 2.  Moderate degenerative disc disease at L4-5 and L5-S1 with

partial 


 calcification of the discs posteriorly, worse at L5-S1 with a broad-based 

central disc protrusion, mild spinal canal stenosis, severe right and moderate 

to 


 severe left neural foraminal narrowing. 3.  Additional comments as described 

above. 








-Continue Decadron as ordered.


-Post-Op management of the C4-C5 discectomy with fusion deferred to 

neurosurgery.


-Neuro checks Q shift and prn for new neurologic complaints/symptoms.


-Notify neuro team of any acute changes in pt's condition.





Mirela Duran, XIMENA, APN


Case discussed with Dr. Tamayo

## 2019-02-22 NOTE — CP.PCM.PN
Subjective





- Date & Time of Evaluation


Date of Evaluation: 02/22/19


Time of Evaluation: 11:11





- Subjective


Subjective: 





SPINE - POD #1





Pt amb in hallway with PT using rolling walker. States he has a sore throat and 

coughing up phlegm. Happy that he "can walk again." Still has tingling in arms 

but less than pre-op.





VSS. Afebrile. Collar in place. Still with clonus/Gibson bilat. Good strength 

in UE's.  strength good as well. Sens intact.





Plan: Mobil;ize as tolerated. Lives in walkup. Will need rehab placement most 

likely. OK for transfer from our point of view when bed available.





Objective





- Vital Signs/Intake and Output


Vital Signs (last 24 hours): 


                                        











Temp Pulse Resp BP Pulse Ox


 


 98.4 F   56 L  19   131/72   98 


 


 02/22/19 00:36  02/22/19 00:36  02/22/19 00:36  02/22/19 00:36  02/22/19 00:36











- Medications


Medications: 


                               Current Medications





Dexamethasone (Decadron Inj)  4 mg IVP Q8 [facility]


   Last Admin: 02/22/19 08:33 Dose:  4 mg


Docusate Sodium (Colace)  100 mg PO BID [facility]


   Last Admin: 02/22/19 08:31 Dose:  100 mg


Enoxaparin Sodium (Lovenox)  40 mg SC DAILY [facility]; Protocol


   Last Admin: 02/22/19 08:33 Dose:  40 mg


Dextrose/Sodium Chloride (Dextrose 5%-0.45% Ns 500 Ml)  500 mls @ 60 mls/hr IV 

.[id number] [facility]


   Stop: 02/22/19 12:06


   Last Admin: 02/22/19 04:48 Dose:  60 mls/hr


Morphine Sulfate (Morphine)  2 mg IVP Q4 PRN


   PRN Reason: Pain, moderate (4-7)


   Last Admin: 02/21/19 21:48 Dose:  2 mg


Oxycodone/Acetaminophen (Percocet 5/325 Mg Tab)  1 tab PO Q4 PRN


   PRN Reason: Pain, Mild (1-3)


   Stop: 02/24/19 12:06


   Last Admin: 02/22/19 04:42 Dose:  1 tab


Pantoprazole Sodium (Protonix Ec Tab)  40 mg PO DAILY [facility]


   Last Admin: 02/22/19 08:34 Dose:  40 mg


Tramadol HCl (Ultram)  50 mg PO Q6 PRN


   PRN Reason: Pain, Mild (1-3)











- Labs


Labs: 


                                        





                                 02/22/19 07:41 





                                 02/22/19 07:41 





                                        











PT  11.6 Seconds (9.8-13.1)   02/18/19  12:57    


 


INR  1.0   02/18/19  12:57    


 


APTT  31.4 Seconds (25.6-37.1)   02/18/19  12:57

## 2019-02-22 NOTE — CP.PCM.PN
Subjective





- Date & Time of Evaluation


Date of Evaluation: 02/22/19


Time of Evaluation: 11:00





- Subjective


Subjective: 


S/P ACDF POD # 1





Patient seen and evaluated in AM. Patient currently doing physical therapy, 

tolerating well, reports improvement in B/L UE weakness and right foot drop. 

Neck collar in place. Denies any UE and LE tingling or numbness. Reports mild 

improvement in urinary incontinence. Patient reports sore throat with dry cough.

Uses incentive spirometer regularly. Last BM 3 days ago. Reports bloating but 

denies abdominal pain, nausea, vomiting, dysuria, calf tenderness, or pedal 

edema.








Objective





- Vital Signs/Intake and Output


Vital Signs (last 24 hours): 


                                        











Temp Pulse Resp BP Pulse Ox


 


 98.4 F   56 L  19   131/72   98 


 


 02/22/19 00:36  02/22/19 00:36  02/22/19 00:36  02/22/19 00:36  02/22/19 00:36











- Medications


Medications: 


                               Current Medications





Benzocaine/Menthol (Cepacol Sore Throat)  1 saul PO Q2 PRN


   PRN Reason: Sore Throat


Dexamethasone (Decadron Inj)  4 mg IVP Q8 [facility]


   Last Admin: 02/22/19 08:33 Dose:  4 mg


Docusate Sodium (Colace)  100 mg PO BID [facility]


   Last Admin: 02/22/19 08:31 Dose:  100 mg


Enoxaparin Sodium (Lovenox)  40 mg SC DAILY [facility]; Protocol


   Last Admin: 02/22/19 08:33 Dose:  40 mg


Morphine Sulfate (Morphine)  2 mg IVP Q4 PRN


   PRN Reason: Pain, moderate (4-7)


   Last Admin: 02/21/19 21:48 Dose:  2 mg


Oxycodone/Acetaminophen (Percocet 5/325 Mg Tab)  1 tab PO Q4 PRN


   PRN Reason: Pain, Mild (1-3)


   Stop: 02/24/19 12:06


   Last Admin: 02/22/19 04:42 Dose:  1 tab


Pantoprazole Sodium (Protonix Ec Tab)  40 mg PO DAILY [facility]


   Last Admin: 02/22/19 08:34 Dose:  40 mg


Tramadol HCl (Ultram)  50 mg PO Q6 PRN


   PRN Reason: Pain, Mild (1-3)











- Labs


Labs: 


                                        





                                 02/22/19 07:41 





                                 02/22/19 07:41 





                                        











PT  11.6 Seconds (9.8-13.1)   02/18/19  12:57    


 


INR  1.0   02/18/19  12:57    


 


APTT  31.4 Seconds (25.6-37.1)   02/18/19  12:57    














- Constitutional


Appears: Well, No Acute Distress





- Head Exam


Head Exam: ATRAUMATIC, NORMAL INSPECTION, NORMOCEPHALIC





- Eye Exam


Eye Exam: EOMI, Normal appearance





- ENT Exam


ENT Exam: Mucous Membranes Moist





- Neck Exam


Neck Exam: Full ROM





- Respiratory Exam


Respiratory Exam: Clear to Ausculation Bilateral, NORMAL BREATHING PATTERN.  a

bsent: Rales, Rhonchi, Wheezes, Respiratory Distress





- Cardiovascular Exam


Cardiovascular Exam: REGULAR RHYTHM, +S1, +S2





- GI/Abdominal Exam


GI & Abdominal Exam: Soft, Hypoactive Bowel Sounds.  absent: Distended, 

Tenderness





- Extremities Exam


Extremities Exam: absent: Calf Tenderness, Pedal Edema, Tenderness





- Neurological Exam


Neurological Exam: Alert, Awake, Oriented x3


Neuro motor strength exam: Left Upper Extremity: 5, Right Upper Extremity: 5, 

Left Lower Extremity: 5, Right Lower Extremity: 4





- Psychiatric Exam


Psychiatric exam: Normal Affect, Normal Mood





- Skin


Skin Exam: Dry, Intact, Normal Color, Warm





Assessment and Plan





- Assessment and Plan (Free Text)


Assessment: 


49 yo M with medical hx of bulging spinal disk admitted due to intractable back 

pain with neurological changes, upper extremity weakness including urinary 

incontinence x 2 weeks.





CT Lumbar Spine w/ and w/o contrast: Moderate central spinal canal stenosis at 

L4-5; marginal osteophytic spurring throughout the lumbar spine 


MRI L spine: Large disc herniation with calcification of the posterior annulus 

L5-S1 level, significant compressive effects on the ventral surface of the 

thecal sac as well as intrathecal and just ex exited extra thecal S1 nerve 

roots.  Small to medium-sized disc herniation L4-L5 level. Broad-based disc 

bulge- protrusion L3-L4 level.


MRI Brain: Unremarkable


MRI C- Spine: No acute fractures.  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 


There is a medium-sized central and bilateral left larger than right disc 

herniation at the C4-C5 level which extends with inferior subligamentous 

extension of disc over the most of the length of the C5 segment to near the C5-

C6 disc space level.  There is moderate to fairly significant cord compression 

of with gliosis and/or myelomalacia in the cervical cord at this level as 

described. Multilevel degenerative spondylosis





Plan: 


 Cervical and lumbar disc herniation with canal stenosis


- S/P ACDF surgery on 2/21, POD 1


- UE and LE weakness improving


- Neurosurgery consult on board - Dr. Ambriz; recs appreciated.


- Neck collar 


- Continue Dexamethazone 4 mg IVP Q8hr


- Continue pain control w/Tramadol, Percocet and Morphine 2 mg IV


- Continue physical therapy





Sore throat


- S/P magic mouthwash


- Start Cepecol saul Q2 hr





Constipation


- Last BM 3 days ago


- On Colace 100 BID


- Miralax PO once





Diet


- Regular diet





DVT prophylaxis


- Continue Lovenox 40 SC 





GI prophylaxis


- Pantoprazole 40 po daily

## 2019-02-23 LAB
BUN SERPL-MCNC: 23 MG/DL (ref 9–20)
CALCIUM SERPL-MCNC: 9.5 MG/DL (ref 8.4–10.2)
ERYTHROCYTE [DISTWIDTH] IN BLOOD BY AUTOMATED COUNT: 13.5 % (ref 11.5–14.5)
GFR NON-AFRICAN AMERICAN: > 60
HGB BLD-MCNC: 14.8 G/DL (ref 12–18)
MCH RBC QN AUTO: 29.7 PG (ref 27–31)
MCHC RBC AUTO-ENTMCNC: 33.4 G/DL (ref 33–37)
MCV RBC AUTO: 88.9 FL (ref 80–94)
PLATELET # BLD: 231 K/UL (ref 130–400)
RBC # BLD AUTO: 4.98 MIL/UL (ref 4.4–5.9)
WBC # BLD AUTO: 10.7 K/UL (ref 4.8–10.8)

## 2019-02-23 RX ADMIN — ENOXAPARIN SODIUM SCH MG: 40 INJECTION SUBCUTANEOUS at 08:31

## 2019-02-23 RX ADMIN — PANTOPRAZOLE SODIUM SCH MG: 40 TABLET, DELAYED RELEASE ORAL at 08:30

## 2019-02-23 RX ADMIN — MORPHINE SULFATE PRN MG: 5 INJECTION, SOLUTION INTRAMUSCULAR; INTRAVENOUS at 08:13

## 2019-02-23 RX ADMIN — DEXAMETHASONE SODIUM PHOSPHATE SCH MG: 4 INJECTION, SOLUTION INTRAMUSCULAR; INTRAVENOUS at 01:45

## 2019-02-23 RX ADMIN — DEXAMETHASONE SODIUM PHOSPHATE SCH MG: 4 INJECTION, SOLUTION INTRAMUSCULAR; INTRAVENOUS at 14:38

## 2019-02-23 NOTE — CP.PCM.PN
Subjective





- Date & Time of Evaluation


Date of Evaluation: 02/23/19


Time of Evaluation: 11:02





- Subjective


Subjective: 





SPINE - POD #2





Pt OOB in chair. Amb down hallway to elevators today with therapy. States 

tingling/sensation improving in hands. Throat less sore.





Afebrile. VSS. Neuro exam unchanged.





Plan: Cont to progress with PT. He lives in 3 story walkup so will require more 

therapy.





Objective





- Vital Signs/Intake and Output


Vital Signs (last 24 hours): 


                                        











Temp Pulse Resp BP Pulse Ox


 


 98.8 F   50 L  21   116/71   97 


 


 02/23/19 08:44  02/23/19 08:44  02/23/19 08:44  02/23/19 08:44  02/23/19 08:44











- Medications


Medications: 


                               Current Medications





Benzocaine/Menthol (Cepacol Sore Throat)  1 saul PO Q2 PRN


   PRN Reason: Sore Throat


   Last Admin: 02/22/19 13:25 Dose:  1 saul


Dexamethasone (Decadron Inj)  4 mg IVP Q12H [facility]


Docusate Sodium (Colace)  100 mg PO BID [facility]


   Last Admin: 02/23/19 08:30 Dose:  100 mg


Enoxaparin Sodium (Lovenox)  40 mg SC DAILY [facility]; Protocol


   Last Admin: 02/23/19 08:31 Dose:  40 mg


Lactulose (Enulose)  20 gm PO DAILY PRN


   PRN Reason: Constipation


Morphine Sulfate (Morphine)  2 mg IVP Q4 PRN


   PRN Reason: Pain, moderate (4-7)


   Last Admin: 02/23/19 08:13 Dose:  2 mg


Oxycodone/Acetaminophen (Percocet 5/325 Mg Tab)  1 tab PO Q4 PRN


   PRN Reason: Pain, Mild (1-3)


   Stop: 02/24/19 12:06


   Last Admin: 02/22/19 21:07 Dose:  1 tab


Pantoprazole Sodium (Protonix Ec Tab)  40 mg PO DAILY [facility]


   Last Admin: 02/23/19 08:30 Dose:  40 mg


Tramadol HCl (Ultram)  50 mg PO Q6 PRN


   PRN Reason: Pain, Mild (1-3)











- Labs


Labs: 


                                        





                                 02/23/19 05:30 





                                 02/23/19 05:30 





                                        











PT  11.6 Seconds (9.8-13.1)   02/18/19  12:57    


 


INR  1.0   02/18/19  12:57    


 


APTT  31.4 Seconds (25.6-37.1)   02/18/19  12:57

## 2019-02-23 NOTE — CP.PCM.PN
Subjective





- Date & Time of Evaluation


Date of Evaluation: 02/23/19


Time of Evaluation: 09:45





- Subjective


Subjective: 





Pt seen while working with PT.    Feels he is better everyday .


Very motivated with doing PT


Pain controlled


denies CP


no SOB


no abd pain











Objective





- Vital Signs/Intake and Output


Vital Signs (last 24 hours): 


                                        











Temp Pulse Resp BP Pulse Ox


 


 98.8 F   50 L  21   116/71   97 


 


 02/23/19 08:44  02/23/19 08:44  02/23/19 08:44  02/23/19 08:44  02/23/19 08:44











- Medications


Medications: 


                               Current Medications





Benzocaine/Menthol (Cepacol Sore Throat)  1 saul PO Q2 PRN


   PRN Reason: Sore Throat


   Last Admin: 02/22/19 13:25 Dose:  1 saul


Dexamethasone (Decadron Inj)  4 mg IVP Q12H [facility]


Docusate Sodium (Colace)  100 mg PO BID [facility]


   Last Admin: 02/23/19 08:30 Dose:  100 mg


Enoxaparin Sodium (Lovenox)  40 mg SC DAILY [facility]; Protocol


   Last Admin: 02/23/19 08:31 Dose:  40 mg


Lactulose (Enulose)  20 gm PO DAILY PRN


   PRN Reason: Constipation


Morphine Sulfate (Morphine)  2 mg IVP Q4 PRN


   PRN Reason: Pain, moderate (4-7)


   Last Admin: 02/23/19 08:13 Dose:  2 mg


Oxycodone/Acetaminophen (Percocet 5/325 Mg Tab)  1 tab PO Q4 PRN


   PRN Reason: Pain, Mild (1-3)


   Stop: 02/24/19 12:06


   Last Admin: 02/22/19 21:07 Dose:  1 tab


Pantoprazole Sodium (Protonix Ec Tab)  40 mg PO DAILY [facility]


   Last Admin: 02/23/19 08:30 Dose:  40 mg


Tramadol HCl (Ultram)  50 mg PO Q6 PRN


   PRN Reason: Pain, Mild (1-3)











- Labs


Labs: 


                                        





                                 02/23/19 05:30 





                                 02/23/19 05:30 





                                        











PT  11.6 Seconds (9.8-13.1)   02/18/19  12:57    


 


INR  1.0   02/18/19  12:57    


 


APTT  31.4 Seconds (25.6-37.1)   02/18/19  12:57    














- Constitutional


Appears: Non-toxic, No Acute Distress





- Head Exam


Head Exam: ATRAUMATIC, NORMAL INSPECTION, NORMOCEPHALIC





- Eye Exam


Eye Exam: EOMI, Normal appearance, PERRL


Pupil Exam: NORMAL ACCOMODATION





- ENT Exam


ENT Exam: Mucous Membranes Moist, Normal External Ear Exam





- Neck Exam


Neck Exam: Full ROM.  absent: Meningismus





- Respiratory Exam


Respiratory Exam: NORMAL BREATHING PATTERN.  absent: Respiratory Distress





- Cardiovascular Exam


Cardiovascular Exam: REGULAR RHYTHM, +S1, +S2





- GI/Abdominal Exam


GI & Abdominal Exam: Soft, Normal Bowel Sounds.  absent: Tenderness





- Extremities Exam


Extremities Exam: Full ROM, Normal Capillary Refill.  absent: Calf Tenderness, 

Pedal Edema





- Back Exam


Back Exam: Full ROM.  absent: CVA tenderness (L), CVA tenderness (R)





- Neurological Exam


Neurological Exam: Alert, Awake, CN II-XII Intact, Oriented x3





- Psychiatric Exam


Psychiatric exam: Normal Affect, Normal Mood





- Skin


Skin Exam: Dry, Normal Color, Warm





Assessment and Plan





- Assessment and Plan (Free Text)


Plan: 





1. Cervical Disc Herniation with Myelopathy/Cord Compression s/p ACDF


- Surgery done 2/22


- pt doing well post op


- Pain mgt


- Physical therapy - will do  PT in house as pt has no insurance so unable to go

to Rehab .  Pt lives in a 3rd floor walk up apartment , will d/c once able to do

stairs


- decrease Decadron to q 12 





 2. Large Lumbar Disc Herniation /Protrusion with compressive effect


- Pt will have surgery after a few wks once he has recuperated from the Cervical

 spine sx


- PT


- Pain mgt

## 2019-02-23 NOTE — RAD
Date of service: 



02/21/2019



PROCEDURE:  Fluoroscopy up to 1 hr.



HISTORY:

C4-C5 ACDF



COMPARISON:

None



TECHNIQUE:

Standard protocol for this study/examination.



FINDINGS:

 Total fluoroscopic time (continuous mode) utilized during the 

procedure 14.8 seconds. 



Total exam DLP: 1.87 (mGy).



IMPRESSION:

## 2019-02-24 RX ADMIN — MORPHINE SULFATE PRN MG: 5 INJECTION, SOLUTION INTRAMUSCULAR; INTRAVENOUS at 08:41

## 2019-02-24 RX ADMIN — DEXAMETHASONE SODIUM PHOSPHATE SCH MG: 4 INJECTION, SOLUTION INTRAMUSCULAR; INTRAVENOUS at 13:16

## 2019-02-24 RX ADMIN — ENOXAPARIN SODIUM SCH MG: 40 INJECTION SUBCUTANEOUS at 08:40

## 2019-02-24 RX ADMIN — DEXAMETHASONE SODIUM PHOSPHATE SCH MG: 4 INJECTION, SOLUTION INTRAMUSCULAR; INTRAVENOUS at 01:09

## 2019-02-24 RX ADMIN — PANTOPRAZOLE SODIUM SCH MG: 40 TABLET, DELAYED RELEASE ORAL at 08:39

## 2019-02-24 NOTE — CP.PCM.PN
Subjective





- Date & Time of Evaluation


Date of Evaluation: 02/24/19


Time of Evaluation: 09:00





- Subjective


Subjective: 





Pt is a 49 yo M POD #3 s/p C4/C5 anterior cervical discectomy w/ fusion doing 

well, participates in PT, wearing soft neck collar, pain under control. Reports 

strength has improved significantly after surgery, only mild numbness and ting

ling of hands. Appetite/sleep good. Still reports saddle anesthesia. Denies 

urinary/stool incontinence, headache, blurry vision, chest pain, SOB, N/V/D/C, 

or dysuria.  





Objective





- Vital Signs/Intake and Output


Vital Signs (last 24 hours): 


                                        











Temp Pulse Resp BP Pulse Ox


 


 98.1 F   56 L  20   134/75   98 


 


 02/24/19 08:23  02/24/19 08:23  02/24/19 08:23  02/24/19 08:23  02/24/19 08:23











- Medications


Medications: 


                               Current Medications





Benzocaine/Menthol (Cepacol Sore Throat)  1 saul PO Q2 PRN


   PRN Reason: Sore Throat


   Last Admin: 02/22/19 13:25 Dose:  1 saul


Dexamethasone (Decadron Inj)  4 mg IVP Q12H [facility]


   Last Admin: 02/24/19 01:09 Dose:  4 mg


Docusate Sodium (Colace)  100 mg PO BID [facility]


   Last Admin: 02/24/19 08:39 Dose:  100 mg


Enoxaparin Sodium (Lovenox)  40 mg SC DAILY [facility]; Protocol


   Last Admin: 02/24/19 08:40 Dose:  40 mg


Lactulose (Enulose)  20 gm PO DAILY PRN


   PRN Reason: Constipation


Morphine Sulfate (Morphine)  2 mg IVP Q4 PRN


   PRN Reason: Pain, moderate (4-7)


   Last Admin: 02/24/19 08:41 Dose:  2 mg


Oxycodone/Acetaminophen (Percocet 5/325 Mg Tab)  1 tab PO Q4 PRN


   PRN Reason: Pain, Mild (1-3)


   Stop: 02/24/19 12:06


   Last Admin: 02/22/19 21:07 Dose:  1 tab


Pantoprazole Sodium (Protonix Ec Tab)  40 mg PO DAILY [facility]


   Last Admin: 02/24/19 08:39 Dose:  40 mg


Tramadol HCl (Ultram)  50 mg PO Q6 PRN


   PRN Reason: Pain, Mild (1-3)











- Labs


Labs: 


                                        





                                 02/23/19 05:30 





                                 02/23/19 05:30 





                                        











PT  11.6 Seconds (9.8-13.1)   02/18/19  12:57    


 


INR  1.0   02/18/19  12:57    


 


APTT  31.4 Seconds (25.6-37.1)   02/18/19  12:57    














- Constitutional


Appears: Non-toxic, No Acute Distress





- Head Exam


Head Exam: ATRAUMATIC, NORMAL INSPECTION, NORMOCEPHALIC





- Eye Exam


Eye Exam: EOMI, Normal appearance





- ENT Exam


ENT Exam: Mucous Membranes Moist





- Neck Exam


Additional comments: 


Wearing soft neck collar








- Respiratory Exam


Respiratory Exam: Clear to Ausculation Bilateral.  absent: Rales, Rhonchi, 

Wheezes





- Cardiovascular Exam


Cardiovascular Exam: RRR, +S1, +S2





- GI/Abdominal Exam


GI & Abdominal Exam: Soft, Normal Bowel Sounds.  absent: Tenderness





- Extremities Exam


Extremities Exam: Normal Inspection





- Neurological Exam


Neurological Exam: Alert, Awake, Oriented x3


Additional comments: 


B/L Strength and sensation UE and LE (5/5)





Assessment and Plan





- Assessment and Plan (Free Text)


Assessment: 


49 yo M with medical hx of bulging spinal disk admitted due to intractable back 

pain with neurological changes, upper extremity weakness including urinary 

incontinence x 2 weeks.





CT Lumbar Spine w/ and w/o contrast: Moderate central spinal canal stenosis at 

L4-5; marginal osteophytic spurring throughout the lumbar spine 


MRI L spine: Large disc herniation with calcification of the posterior annulus 

L5-S1 level, significant compressive effects on the ventral surface of the 

thecal sac as well as intrathecal and just ex exited extra thecal S1 nerve 

roots.  Small to medium-sized disc herniation L4-L5 level. Broad-based disc 

bulge- protrusion L3-L4 level.


MRI Brain: Unremarkable


MRI C- Spine: No acute fractures.  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 


There is a medium-sized central and bilateral left larger than right disc 

herniation at the C4-C5 level which extends with inferior subligamentous 

extension of disc over the most of the length of the C5 segment to near the C5-

C6 disc space level.  There is moderate to fairly significant cord compression 

of with gliosis and/or myelomalacia in the cervical cord at this level as 

described. Multilevel degenerative spondylosis





Plan: 


Cervical Disc Herniation with Myelopathy/Cord Compression s/p ACDF


- POD#3, doing well 


- UE and LE weakness significantly improved


- Neurosurgery consulted- Dr. Ambriz; recs appreciated.


- Neuro consulted- Dr. Tamayo- reccs appreciated


- C/w soft neck collar 


- C/w Dexamethazone 4 mg IVP Q12


- C/w pain control w/Tramadol,and Morphine 2 mg IV


- C/w physical therapy


- Pt doing PT in house due to no insurance cant go to rehab, will D/C once able 

to walk up stairs (lives on the 3rd floor apt) with outpt rehab





 Large Lumbar Disc Herniation /Protrusion with compressive effect


- continues to have saddle anesthesia


- Pt will have surgery of L spine after a few wks once he has recuperated from 

the Cervical  spine sx- pt aware and in agreement 


- c/w PT


- Pain mgt





Sore throat


- S/P magic mouthwash


- c/w Cepecol saul Q2 hr





Constipation


-acute, resolved


-On Colace 100 BID


-Lactulose PRN





Diet


- Dysphagia modified





DVT prophylaxis


- Continue Lovenox 40 SC 





GI prophylaxis


- Pantoprazole 40 po daily





Case reviewed and discussed with Dr. Arnol Suarez PGY1

## 2019-02-25 VITALS — RESPIRATION RATE: 20 BRPM

## 2019-02-25 RX ADMIN — PANTOPRAZOLE SODIUM SCH MG: 40 TABLET, DELAYED RELEASE ORAL at 08:36

## 2019-02-25 RX ADMIN — ENOXAPARIN SODIUM SCH MG: 40 INJECTION SUBCUTANEOUS at 08:37

## 2019-02-25 RX ADMIN — DEXAMETHASONE SODIUM PHOSPHATE SCH MG: 4 INJECTION, SOLUTION INTRAMUSCULAR; INTRAVENOUS at 01:21

## 2019-02-25 RX ADMIN — PURIFIED WATER PRN LOZ: 99.05 LIQUID OPHTHALMIC at 03:11

## 2019-02-25 RX ADMIN — DEXAMETHASONE SODIUM PHOSPHATE SCH MG: 4 INJECTION, SOLUTION INTRAMUSCULAR; INTRAVENOUS at 13:07

## 2019-02-25 NOTE — CP.PCM.PN
<Dillon Corley - Last Filed: 02/25/19 14:48>





Subjective





- Date & Time of Evaluation


Date of Evaluation: 02/25/19


Time of Evaluation: 09:10





- Subjective


Subjective: 


49 yo M POD #4 s/p C4/C5 anterior cervical discectomy





Patient seen and evaluated at bedside today. NAD. Patient tolerating Physical 

therapy well, Patient was able to walk few stairs today. B/L LE and UE weakness 

improving with physical therapy. Mild R LE tingling and numbness. Patient 

continues to have sore throat improved compare to yesterday. Tolerating diet 

well PO. Normal BM. Denies any CP, SOB, abdominal pain,nausea, vomiting, 

diarrhea, dizziness. 








Objective





- Vital Signs/Intake and Output


Vital Signs (last 24 hours): 


                                        











Temp Pulse Resp BP Pulse Ox


 


 97.6 F   54 L  20   120/63   99 


 


 02/25/19 09:00  02/25/19 09:00  02/25/19 09:00  02/25/19 09:00  02/25/19 09:00











- Medications


Medications: 


                               Current Medications





Benzocaine/Menthol (Cepacol Sore Throat)  1 saul PO Q2 PRN


   PRN Reason: Sore Throat


   Last Admin: 02/25/19 03:11 Dose:  1 saul


Dexamethasone (Decadron Inj)  4 mg IVP Q12H [facility]


   Last Admin: 02/25/19 13:07 Dose:  4 mg


Docusate Sodium (Colace)  100 mg PO BID [facility]


   Last Admin: 02/25/19 08:37 Dose:  100 mg


Enoxaparin Sodium (Lovenox)  40 mg SC DAILY [facility]; Protocol


   Last Admin: 02/25/19 08:37 Dose:  40 mg


Lactulose (Enulose)  20 gm PO DAILY PRN


   PRN Reason: Constipation


Morphine Sulfate (Morphine)  2 mg IVP Q4 PRN


   PRN Reason: Pain, moderate (4-7)


   Last Admin: 02/24/19 08:41 Dose:  2 mg


Pantoprazole Sodium (Protonix Ec Tab)  40 mg PO DAILY [facility]


   Last Admin: 02/25/19 08:36 Dose:  40 mg


Tramadol HCl (Ultram)  50 mg PO Q6 PRN


   PRN Reason: Pain, Mild (1-3)











- Labs


Labs: 


                                        





                                 02/23/19 05:30 





                                 02/23/19 05:30 





                                        











PT  11.6 Seconds (9.8-13.1)   02/18/19  12:57    


 


INR  1.0   02/18/19  12:57    


 


APTT  31.4 Seconds (25.6-37.1)   02/18/19  12:57    














- Constitutional


Appears: Well, No Acute Distress





- Head Exam


Head Exam: ATRAUMATIC, NORMAL INSPECTION, NORMOCEPHALIC





- Eye Exam


Eye Exam: EOMI, Normal appearance





- ENT Exam


ENT Exam: Mucous Membranes Moist





- Neck Exam


Additional comments: 


Limited ROM


Neck collar present








- Respiratory Exam


Respiratory Exam: Clear to Ausculation Bilateral, NORMAL BREATHING PATTERN





- Cardiovascular Exam


Cardiovascular Exam: REGULAR RHYTHM, +S1, +S2





- GI/Abdominal Exam


GI & Abdominal Exam: Soft, Normal Bowel Sounds.  absent: Tenderness





- Extremities Exam


Extremities Exam: Normal Inspection.  absent: Calf Tenderness, Pedal Edema, 

Tenderness





- Back Exam


Back Exam: NORMAL INSPECTION.  absent: tenderness





- Neurological Exam


Neurological Exam: Abnormal Gait, Alert, Awake, Oriented x3


Neuro motor strength exam: Left Upper Extremity: 5, Right Upper Extremity: 5, 

Left Lower Extremity: 5, Right Lower Extremity: 4





- Psychiatric Exam


Psychiatric exam: Normal Affect, Normal Mood





- Skin


Skin Exam: Dry, Intact, Normal Color, Warm





Assessment and Plan





- Assessment and Plan (Free Text)


Assessment: 


49 yo M with medical hx of bulging spinal disk admitted due to intractable back 

pain with neurological changes, upper extremity weakness including urinary 

incontinence x 2 weeks.





CT Lumbar Spine w/ and w/o contrast: Moderate central spinal canal stenosis at 

L4-5; marginal osteophytic spurring throughout the lumbar spine 


MRI L spine: Large disc herniation with calcification of the posterior annulus 

L5-S1 level, significant compressive effects on the ventral surface of the 

thecal sac as well as intrathecal and just ex exited extra thecal S1 nerve 

roots.  Small to medium-sized disc herniation L4-L5 level. Broad-based disc 

bulge- protrusion L3-L4 level.


MRI Brain: Unremarkable


MRI C- Spine: No acute fractures.  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 


There is a medium-sized central and bilateral left larger than right disc 

herniation at the C4-C5 level which extends with inferior subligamentous 

extension of disc over the most of the length of the C5 segment to near the C5-

C6 disc space level.  There is moderate to fairly significant cord compression 

of with gliosis and/or myelomalacia in the cervical cord at this level as 

described. Multilevel degenerative spondylosis





Plan: 


Cervical Disc Herniation with Myelopathy/Cord Compression s/p ACDF and C4-C5 

dissectomy


- POD#4, doing well 


- UE and LE weakness improving


- Neurosurgery consulted- Dr. Ambriz; recs appreciated.


- Neuro consulted- Dr. Tamayo- reccs appreciated


- C/w soft neck collar 


- C/w Dexamethazone 4 mg IVP Q12


- C/w pain control w/Tramadol,and Morphine 2 mg IV


- C/w physical therapy





 Large Lumbar Disc Herniation /Protrusion with compressive effect


- Pt will have surgery of L spine after a few wks once he has recuperated from 

the Cervical  spine sx- pt aware and in agreement 


- c/w PT


- Pain mgt





Sore throat


- S/P magic mouthwash


- Started on Phenospetic spray


- c/w Cepecol saul Q2 hr





Constipation


-acute, resolved


-On Colace 100 BID


-Lactulose PRN





Diet


- Dysphagia modified





DVT prophylaxis


- Continue Lovenox 40 SC 





GI prophylaxis


- Pantoprazole 40 po daily





Dispo: - Will D/C likely with home PT and outpatient rehab upon D/C. will D/C 

once able to walk up stairs (lives on the 3rd floor apt).








<Sandy Smyth - Last Filed: 02/25/19 17:13>





Objective





- Vital Signs/Intake and Output


Vital Signs (last 24 hours): 


                                        











Temp Pulse Resp BP Pulse Ox


 


 98.1 F   54 L  18   104/61   98 


 


 02/25/19 16:27  02/25/19 09:00  02/25/19 16:27  02/25/19 16:27  02/25/19 16:27











- Medications


Medications: 


                               Current Medications





Benzocaine/Menthol (Cepacol Sore Throat)  1 saul PO Q2 PRN


   PRN Reason: Sore Throat


   Last Admin: 02/25/19 03:11 Dose:  1 saul


Dexamethasone (Decadron Inj)  4 mg IVP Q12H [facility]


   Last Admin: 02/25/19 13:07 Dose:  4 mg


Docusate Sodium (Colace)  100 mg PO BID [facility]


   Last Admin: 02/25/19 16:56 Dose:  100 mg


Enoxaparin Sodium (Lovenox)  40 mg SC DAILY [facility]; Protocol


   Last Admin: 02/25/19 08:37 Dose:  40 mg


Lactulose (Enulose)  20 gm PO DAILY PRN


   PRN Reason: Constipation


Morphine Sulfate (Morphine)  2 mg IVP Q4 PRN


   PRN Reason: Pain, moderate (4-7)


   Last Admin: 02/24/19 08:41 Dose:  2 mg


Pantoprazole Sodium (Protonix Ec Tab)  40 mg PO DAILY [facility]


   Last Admin: 02/25/19 08:36 Dose:  40 mg


Tramadol HCl (Ultram)  50 mg PO Q6 PRN


   PRN Reason: Pain, Mild (1-3)











- Labs


Labs: 


                                        





                                 02/23/19 05:30 





                                 02/23/19 05:30 





                                        











PT  11.6 Seconds (9.8-13.1)   02/18/19  12:57    


 


INR  1.0   02/18/19  12:57    


 


APTT  31.4 Seconds (25.6-37.1)   02/18/19  12:57    














Attending/Attestation





- Attestation


I have personally seen and examined this patient.: Yes


I have fully participated in the care of the patient.: Yes


I have reviewed all pertinent clinical information, including history, physical 

exam and plan: Yes


Notes (Text): 








1. Cervical Disc Herniation with Myelopathy/Cord Compression s/p ACDF


- Surgery done 2/22


- pt doing well post op


- Pain mgt


- Physical therapy - will do  PT in house as pt has no insurance so unable to go

to Rehab .  Pt lives in a 3rd floor walk up apartment , will d/c once able to do

stairs.  Accdg to PT doing well, will  continue PT while inhouse and possible 

d/c in am if able to do more stairs


- cont  Decadron 4 mg IV  q 12 





 2. Large Lumbar Disc Herniation /Protrusion with compressive effect


- Pt will have surgery after a few wks once he has recuperated from the Cervical

 spine sx


- Pain mgt


- Rx for Rolling walker

## 2019-02-25 NOTE — CP.PCM.PN
Subjective





- Date & Time of Evaluation


Date of Evaluation: 02/25/19


Time of Evaluation: 11:50





- Subjective


Subjective: 





Neuro Follow-Up Note:





Mr. Gregory was evaluated this morning at bedside.  Pt is POD #4 of C4-C5 anterior

cervical discectomy with fusion.  He still reports a sore throat, however, 

improving.  He also admits that his weakness and paresthesias to arms and legs 

are better since the procedure last week.  He is ambulating with PT and doing 

stair training; tolerating well per him.  He is in good spirits; eager to be d/c

this week (by Wednesday per pt).  Denies h/a, dizziness, visual changes, chest 

pain, palpitations, sob, cough, abd pain, n/v/d.








Objective





- Vital Signs/Intake and Output


Vital Signs (last 24 hours): 


                                        











Temp Pulse Resp BP Pulse Ox


 


 97.6 F   54 L  20   120/63   99 


 


 02/25/19 09:00  02/25/19 09:00  02/25/19 09:00  02/25/19 09:00  02/25/19 09:00











- Medications


Medications: 


                               Current Medications





Benzocaine/Menthol (Cepacol Sore Throat)  1 saul PO Q2 PRN


   PRN Reason: Sore Throat


   Last Admin: 02/25/19 03:11 Dose:  1 saul


Dexamethasone (Decadron Inj)  4 mg IVP Q12H [facility]


   Last Admin: 02/25/19 01:21 Dose:  4 mg


Docusate Sodium (Colace)  100 mg PO BID [facility]


   Last Admin: 02/25/19 08:37 Dose:  100 mg


Enoxaparin Sodium (Lovenox)  40 mg SC DAILY [facility]; Protocol


   Last Admin: 02/25/19 08:37 Dose:  40 mg


Lactulose (Enulose)  20 gm PO DAILY PRN


   PRN Reason: Constipation


Morphine Sulfate (Morphine)  2 mg IVP Q4 PRN


   PRN Reason: Pain, moderate (4-7)


   Last Admin: 02/24/19 08:41 Dose:  2 mg


Pantoprazole Sodium (Protonix Ec Tab)  40 mg PO DAILY [facility]


   Last Admin: 02/25/19 08:36 Dose:  40 mg


Tramadol HCl (Ultram)  50 mg PO Q6 PRN


   PRN Reason: Pain, Mild (1-3)











- Labs


Labs: 


                                        





                                 02/23/19 05:30 





                                 02/23/19 05:30 





                                        











PT  11.6 Seconds (9.8-13.1)   02/18/19  12:57    


 


INR  1.0   02/18/19  12:57    


 


APTT  31.4 Seconds (25.6-37.1)   02/18/19  12:57    














- Constitutional


Appears: Well, Non-toxic, No Acute Distress





- Head Exam


Head Exam: ATRAUMATIC, NORMAL INSPECTION, NORMOCEPHALIC





- Eye Exam


Eye Exam: EOMI, Normal appearance, PERRL


Pupil Exam: NORMAL ACCOMODATION, PERRL





- ENT Exam


ENT Exam: Mucous Membranes Moist





- Neck Exam


Neck Exam: absent: Full ROM


Additional comments: 





pt has on soft c-collar; post-op incision with dressing





- Respiratory Exam


Respiratory Exam: NORMAL BREATHING PATTERN





- Extremities Exam


Extremities Exam: Full ROM.  absent: Calf Tenderness, Pedal Edema


Additional comments: 





Able to move all extremities independently











- Neurological Exam


Neurological Exam: Alert, Awake, CN II-XII Intact, Oriented x3


Neuro motor strength exam: Left Upper Extremity: 5 ( 5/5), Right Upper 

Extremity: 5 ( 5/5), Left Lower Extremity: 5 (plantar flexion 5/5), Right 

Lower Extremity: 5 (plantar flexion 3-4/5)


Additional comments: 


Speech clear


Able to move all extremities, including RLE.  Some weakness noted to RLE with 

plantar flexion, overall much improved.


Sensation intact throughout b/l


Reflexes brisk b/l


No tremors


Gait not assessed





- Psychiatric Exam


Psychiatric exam: Normal Affect, Normal Mood





- Skin


Skin Exam: Normal Color





Assessment and Plan





- Assessment and Plan (Free Text)


Assessment: 





A/P: 





Mr. Gregory is a 49 y/o M who was admitted for worsening lower back pain and uri

nary incontinence for approx 2-3 weeks.  He was found to have a large disc 

herniation at the level of L5-S1 with significant compressive effects on thecal 

nerve (see report below).  Today he is PO #4 of C4-C5 anterior cervical 

discectomy with fusion.





Imaging reviewed:





-C-Spine MRI (2/19/19): Limited motion degraded study. No acute fractures.  

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  

There is a medium-sized central and bilateral left larger than right disc her

niation at the C4-C5 level which extends with inferior subligamentous extension 

of disc over the most of the length of the C5 segment to near the C5-C6 disc 

space level.  There is moderate to fairly significant cord compression of with 

gliosis and/or myelomalacia in the cervical cord at this level as described.  

Multilevel degenerative spondylosis with varying degrees of canal and foraminal 

stenosis as described above. 


-Brain MRI (2/18/19): Slightly limited motion degraded study.  No acute intr

acranial hemorrhage or infarction.  No obvious parenchymal nor extra-axial 

masses 


 or collections.  No acute intracranial.


-L-Spine MRI (2/18/19): Slightly limited motion degraded study.  Large disc 

herniation with calcification of the posterior annulus L5-S1 level that extends 

below 


 and to a slightly lesser degree just above the level of the disc space and 

results in moderate to fairly significant compressive effects on the ventral 

surface of 


 the thecal sac as well as intrathecal and just ex exited extra thecal S1 nerve 

roots.  Small to medium-sized disc herniation L4-L5 level.  Broad-based disc 


 bulge- protrusion L3-L4 level as detailed above.


-L-Spine CT (2/17/19): 1.  No acute fracture, spondylolysis or 

spondylolisthesis. 2.  Moderate degenerative disc disease at L4-5 and L5-S1 with

partial 


 calcification of the discs posteriorly, worse at L5-S1 with a broad-based 

central disc protrusion, mild spinal canal stenosis, severe right and moderate 

to 


 severe left neural foraminal narrowing. 3.  Additional comments as described 

above. 








-Post-Op management of the C4-C5 discectomy with fusion deferred to 

neurosurgery.


-Neuro checks Q shift and prn for new neurologic complaints/symptoms.


-Continue PT/OT.


-Notify neuro team of any acute changes in pt's condition.





Mirela Duran, XIMENA, APN


Case discussed with Dr. Rader

## 2019-02-25 NOTE — OP
PROCEDURE DATE:  02/21/2019



SURGEON:  Rupesh Ambriz MD



ASSISTANT:  Tiago Arzate MD



PREOPERATIVE DIAGNOSES:  C4-C5 cervical disk herniation, cord compression

and myelomalacia.



POSTOPERATIVE DIAGNOSES:  C4-C5 cervical disk herniation, cord compression

and myelomalacia.



OPERATIVE PROCEDURE:  Anterior cervical diskectomy, placement of

intervertebral device, placement of anterior locking plate harvesting bone

marrow locally.



DESCRIPTION OF PROCEDURE:  The patient was brought to the operating room,

intubated appropriately.  Head was kept in neutral position.  C-arm

fluoroscopy, SSEP, and EMG potentials were monitored throughout the case. 

No motor evoked potentials could be obtained either at baseline before

surgery or during the entire operative procedure.  An incision was made

from the midline to the right approximately 2 inches in length, taken

through the subcuticular tissue.  The platysma muscle was divided along its

fibers, and avascular plane was developed bringing this down to the

anterior vertebral bodies.  Using the Cloward retractors, we identified the

C4-C5 disk space and placed the spinal needle in it.  We obtained an x-ray

verifying the location as well as marked the disk space and then used the

electrocautery to elevate the longus colli muscles bilaterally of the C4

and C5 vertebral bodies, placed the Trimline retractors horizontally and

vertically as the anesthesiologist to deflate the ET tube cuff and then

placed the spinal needle back into C4-C5 disk verifying all locations on

x-ray.  At this point, we did down an anterior osteophyte on the C4 body

using a small Leksell rongeur and then proceeded to incise the disk space

and the annulus sharply with a 15-blade and removed the disk material in a

piecemeal fashion until significant portion of the disk was removed.  The

microscope was brought in.  The rest of the procedure was done under the

operating microscope.  We continued using a combination of curettes and

pituitary rongeurs to remove further disk material and scrape the end

plates.  We then used 1-mm and 2-mm Kerrison rongeurs to bide away the

posterior annulus as well as posterior longitudinal ligament.  We found a

large free central disk fragment below the posterior longitudinal ligament

which was easily removed.  We continued to bide away the posterior

longitudinal ligament and annulus and performed foraminotomies bilaterally

and then passed the nerve circumferentially around the interspace found

that was thoroughly decompressed.  At this point, we measured the depth of

the interspace.  We used the trials from the DePuy Bengal cage to identify.

We needed a 5-mm Bengal cage.  We then placed a needle in to the C5

vertebral body, aspirated 1 mL of bone marrow, placed it on a Conform cube

which was placed along with graft on putty into a Bangle cage of 5 mm which

was then tamped into place.  A _____ was then chosen and placed over the

vertebral bodies under direct x-ray visualization.  Two 15-mm screws were

placed, one in C4 and one in C5.  We verified the location of hardware and

screws on final C-arm fluoroscopy and then tightened the locking plates of

those screws.  At this point, the wound was thoroughly irrigated. 

Hemostasis was meticulous.  The platysma muscle was reapproximated using a

3-0 Vicryl, deep subcuticular with a 3-0 Vicryl and then a running 4-0

Monocryl suture was placed followed by Dermabond, sterile dressing and soft

collar.  The patient was awoken from anesthesia, found to be moving all

extremities in stable condition.



BLOOD LOSS:  Minimal.



COUNTS:  All counts were correct.



SPECIMEN:  Disk material of C4-C5.



The patient was taken to the recovery room in stable condition.







__________________________________________

Rupesh Ambriz MD





DD:  02/21/2019 18:38:38

DT:  02/22/2019 0:46:58

Job # [id number]

## 2019-02-26 VITALS
OXYGEN SATURATION: 95 % | SYSTOLIC BLOOD PRESSURE: 120 MMHG | HEART RATE: 51 BPM | DIASTOLIC BLOOD PRESSURE: 70 MMHG | TEMPERATURE: 97.9 F

## 2019-02-26 RX ADMIN — ENOXAPARIN SODIUM SCH MG: 40 INJECTION SUBCUTANEOUS at 09:15

## 2019-02-26 RX ADMIN — MORPHINE SULFATE PRN MG: 5 INJECTION, SOLUTION INTRAMUSCULAR; INTRAVENOUS at 08:11

## 2019-02-26 RX ADMIN — PANTOPRAZOLE SODIUM SCH MG: 40 TABLET, DELAYED RELEASE ORAL at 09:16

## 2019-02-26 RX ADMIN — DEXAMETHASONE SODIUM PHOSPHATE SCH MG: 4 INJECTION, SOLUTION INTRAMUSCULAR; INTRAVENOUS at 00:54

## 2019-02-26 RX ADMIN — PURIFIED WATER PRN LOZ: 99.05 LIQUID OPHTHALMIC at 09:15

## 2019-02-26 NOTE — CP.PCM.DIS
Provider





- Provider


Date of Admission: 


02/18/19 16:48





Attending physician: 


Artemio Deleon MD





Primary care physician: 


None


Consults: 








02/17/19 19:15


Neuro Surgery Consult Stat 


   Comment: 


   Consulting Provider: Rupesh Ambriz


   Consulting Physician: Rupesh Ambriz


   Reason for Consult: low back pain, urinary incontinence





02/18/19 06:23


Case Management Referral Routine 


   Comment: 


   Physician Instructions: 


   Reason For Exam: see assessment


   Reason for Referral: Discharge Planning


Social Work Referral Routine 


   Comment: see assessment


   Physician Instructions: 


   Reason For Exam: see assessment





02/18/19 07:43


Neurology Consult Routine 


   Comment: upper ext weakness, urinary incont, lumb spinal st


   Consulting Provider: Raghavendra Tamayo


   Consulting Physician: Raghavendra Tamayo


   Reason for Consult: upper ext weakness, urinary incont, lumb spinal st











Time Spent in preparation of Discharge (in minutes): 20





Hospital Course





- Lab Results


Lab Results: 


                             Most Recent Lab Values











WBC  10.7 K/uL (4.8-10.8)   02/23/19  05:30    


 


RBC  4.98 Mil/uL (4.40-5.90)   02/23/19  05:30    


 


Hgb  14.8 g/dL (12.0-18.0)   02/23/19  05:30    


 


Hct  44.3 % (35.0-51.0)   02/23/19  05:30    


 


MCV  88.9 fl (80.0-94.0)   02/23/19  05:30    


 


MCH  29.7 pg (27.0-31.0)   02/23/19  05:30    


 


MCHC  33.4 g/dL (33.0-37.0)   02/23/19  05:30    


 


RDW  13.5 % (11.5-14.5)   02/23/19  05:30    


 


Plt Count  231 K/uL (130-400)   02/23/19  05:30    


 


MPV  8.5 fl (7.2-11.7)   02/22/19  07:41    


 


Neut % (Auto)  82.2 % (50.0-75.0)  H  02/22/19  07:41    


 


Lymph % (Auto)  10.0 % (20.0-40.0)  L  02/22/19  07:41    


 


Mono % (Auto)  7.7 % (0.0-10.0)   02/22/19  07:41    


 


Eos % (Auto)  0.0 % (0.0-4.0)   02/22/19  07:41    


 


Baso % (Auto)  0.1 % (0.0-2.0)   02/22/19  07:41    


 


Neut # (Auto)  10.2 K/uL (1.8-7.0)  H  02/22/19  07:41    


 


Lymph # (Auto)  1.2 K/uL (1.0-4.3)   02/22/19  07:41    


 


Mono # (Auto)  1.0 K/uL (0.0-0.8)  H  02/22/19  07:41    


 


Eos # (Auto)  0.0 K/uL (0.0-0.7)   02/22/19  07:41    


 


Baso # (Auto)  0.0 K/uL (0.0-0.2)   02/22/19  07:41    


 


Neutrophils % (Manual)  82 % (42-75)  H  02/21/19  08:36    


 


Lymphocytes % (Manual)  14 % (20-50)  L  02/21/19  08:36    


 


Monocytes % (Manual)  4 % (0-10)   02/21/19  08:36    


 


Platelet Estimate  Normal  (NORMAL)   02/21/19  08:36    


 


RBC Morphology  Normal  (NORMAL)   02/21/19  08:36    


 


ESR  11 mm/hr (0-15)   02/17/19  19:00    


 


PT  11.6 Seconds (9.8-13.1)   02/18/19  12:57    


 


INR  1.0   02/18/19  12:57    


 


APTT  31.4 Seconds (25.6-37.1)   02/18/19  12:57    


 


Sodium  137 mmol/l (132-148)   02/23/19  05:30    


 


Potassium  4.5 MMOL/L (3.6-5.0)   02/23/19  05:30    


 


Chloride  98 mmol/L ()   02/23/19  05:30    


 


Carbon Dioxide  27 mmol/L (22-30)   02/23/19  05:30    


 


Anion Gap  17  (10-20)   02/23/19  05:30    


 


BUN  23 mg/dl (9-20)  H  02/23/19  05:30    


 


Creatinine  1.1 mg/dl (0.8-1.5)   02/23/19  05:30    


 


Est GFR ( Amer)  > 60   02/23/19  05:30    


 


Est GFR (Non-Af Amer)  > 60   02/23/19  05:30    


 


Random Glucose  123 mg/dL ()  H  02/23/19  05:30    


 


Calcium  9.5 mg/dL (8.4-10.2)   02/23/19  05:30    


 


Total Bilirubin  0.5 mg/dl (0.2-1.3)   02/21/19  08:36    


 


AST  22 U/L (17-59)   02/21/19  08:36    


 


ALT  38 U/L (21-72)   02/21/19  08:36    


 


Alkaline Phosphatase  76 U/L ()   02/21/19  08:36    


 


Total Creatine Kinase  196 U/L ()  H  02/18/19  09:10    


 


Total Protein  7.9 G/DL (6.3-8.2)   02/21/19  08:36    


 


Albumin  4.5 g/dL (3.5-5.0)   02/21/19  08:36    


 


Globulin  3.4 gm/dL (2.2-3.9)   02/21/19  08:36    


 


Albumin/Globulin Ratio  1.3  (1.0-2.1)   02/21/19  08:36    


 


Urine Color  Yellow  (YELLOW)   02/17/19  19:00    


 


Urine Clarity  Clear  (Clear)   02/17/19  19:00    


 


Urine pH  6.0  (5.0-8.0)   02/17/19  19:00    


 


Ur Specific Gravity  1.020  (1.003-1.030)   02/17/19  19:00    


 


Urine Protein  Negative mg/dL (NEGATIVE)   02/17/19  19:00    


 


Urine Glucose (UA)  Neg mg/dL (NEGATIVE)   02/17/19  19:00    


 


Urine Ketones  Negative mg/dL (NEGATIVE)   02/17/19  19:00    


 


Urine Blood  Moderate  (NEGATIVE)   02/17/19  19:00    


 


Urine Nitrate  Negative  (NEGATIVE)   02/17/19  19:00    


 


Urine Bilirubin  Negative  (NEGATIVE)   02/17/19  19:00    


 


Urine Urobilinogen  0.2-1.0 mg/dL (0.2-1.0)   02/17/19  19:00    


 


Ur Leukocyte Esterase  Neg Timothy/uL (Negative)   02/17/19  19:00    


 


Urine RBC (Auto)  7 /hpf (0-3)  H  02/17/19  19:00    


 


Urine Microscopic WBC  1 /hpf (0-5)   02/17/19  19:00    


 


Ur Squamous Epith Cells  1 /hpf (0-5)   02/17/19  19:00    


 


Urine Opiates Screen  Negative  (NEGATIVE)   02/17/19  19:00    


 


Urine Methadone Screen  Negative  (NEGATIVE)   02/17/19  19:00    


 


Ur Barbiturates Screen  Negative  (NEGATIVE)   02/17/19  19:00    


 


Ur Phencyclidine Scrn  Negative  (NEGATIVE)   02/17/19  19:00    


 


Ur Amphetamines Screen  Negative  (NEGATIVE)   02/17/19  19:00    


 


U Benzodiazepines Scrn  Negative  (NEGATIVE)   02/17/19  19:00    


 


U Oth Cocaine Metabols  Negative  (NEGATIVE)   02/17/19  19:00    


 


U Cannabinoids Screen  Positive  (NEGATIVE)  H  02/17/19  19:00    


 


Blood Type  B POSITIVE   02/21/19  06:20    


 


Blood Type Confirm  B POSITIVE   02/21/19  07:22    


 


Antibody Screen  Negative   02/21/19  06:20    


 


BBK History Checked  No verified bt   02/21/19  06:20    














- Hospital Course


Hospital Course: 


47 y/o male with no PMH presented with worsening back pain , bilateral LE and UE

weakness 


MRI spine showed cervical and lumbar spine disc herniation and spinal stenosis 


S/p  ACDF of cervical spine .POst op patient feeling much better ,with improved 

sensation and power to botupper and lower extremities


PT was consulted and patient had gain training and exercise with walker 


At present feeling much better 


Will d/c home with rolling walker 


Follow up with [facility] and outpatient PT 








1. s/p ACDF cervical spine, discectomy C4-C5


Improved weakness to both upper and lower extremities 


keep soft collar on 


Cleared by spine surgeon and  PT and discharge


Continue with PT as outpatient 


Tramadol PRN for pain 


received Decadron IV 








2.Constipation 


resolved 


continue Colace 





3.Lumbar and cervical disc herniation and spinal stenosis


continue PT as outpatient  


follow up with spine surgeon 





4.Leukocytosis


steroid induced 


resolved 





5.Bradycardia


asymptomatic 








Discharge Exam





- Head Exam


Head Exam: ATRAUMATIC, NORMAL INSPECTION, NORMOCEPHALIC





- Eye Exam


Eye Exam: EOMI, Normal appearance, PERRL


Pupil Exam: NORMAL ACCOMODATION





- ENT Exam


ENT Exam: Mucous Membranes Moist, Normal Exam





- Neck Exam


Neck exam: Full Rom, Normal Inspection





- Respiratory Exam


Respiratory Exam: Clear to PA & Lateral, NORMAL BREATHING PATTERN.  absent: 

Rales, Rhonchi, Wheezes





- Cardiovascular Exam


Cardiovascular Exam: REGULAR RHYTHM, RRR, +S1, +S2.  absent: JVD





- GI/Abdominal Exam


GI & Abdominal Exam: Normal Bowel Sounds, Soft.  absent: Distended, Guarding, 

Rebound, Tenderness





- Rectal Exam


Rectal Exam: Deferred





- Extremities Exam


Extremities exam: normal capillary refill, normal inspection, pedal pulses 

present





- Back Exam


Back exam: NORMAL INSPECTION





- Neurological Exam


Neurological exam: Alert, CN II-XII Intact, Oriented x3





- Psychiatric Exam


Psychiatric exam: Normal Affect, Normal Mood





- Skin


Skin Exam: Dry, Intact, Normal Color, Warm





Discharge Plan





- Discharge Medications


Prescriptions: 


Docusate [Colace] 100 mg PO BID #60 cap


traMADol [Ultram] 50 mg PO Q6 PRN #30 tab


 PRN Reason: Pain, Severe (8-10)





- Follow Up Plan


Condition: STABLE


Disposition: HOME/ ROUTINE


Patient education suggested?: Yes


Instructions:  Low Back Pain  (DC), Urinary Incontinence, Male (DC)


Additional Instructions: 


follow up with primary MD 1 week 


Referrals: 


[facility] [Outside]


Rupesh Ambriz MD [Staff Provider] - 


Mirela Duran, SEAN, APN [Advanced Practice Nurse] - 


Raghavendra Tamayo MD [Medical Doctor] -

## 2019-05-03 ENCOUNTER — HOSPITAL ENCOUNTER (OUTPATIENT)
Dept: HOSPITAL 31 - C.PAT | Age: 49
End: 2019-05-03
Payer: COMMERCIAL

## 2019-05-03 DIAGNOSIS — M54.5: Primary | ICD-10-CM

## 2019-05-10 ENCOUNTER — HOSPITAL ENCOUNTER (EMERGENCY)
Dept: HOSPITAL 31 - C.ER | Age: 49
Discharge: HOME | End: 2019-05-10
Payer: COMMERCIAL

## 2019-05-10 VITALS
HEART RATE: 77 BPM | TEMPERATURE: 99.1 F | RESPIRATION RATE: 18 BRPM | OXYGEN SATURATION: 97 % | SYSTOLIC BLOOD PRESSURE: 154 MMHG | DIASTOLIC BLOOD PRESSURE: 79 MMHG

## 2019-05-10 VITALS — BODY MASS INDEX: 35.6 KG/M2

## 2019-05-10 DIAGNOSIS — J32.9: Primary | ICD-10-CM
